# Patient Record
Sex: FEMALE | Race: WHITE | NOT HISPANIC OR LATINO
[De-identification: names, ages, dates, MRNs, and addresses within clinical notes are randomized per-mention and may not be internally consistent; named-entity substitution may affect disease eponyms.]

---

## 2019-06-26 ENCOUNTER — APPOINTMENT (OUTPATIENT)
Dept: GASTROENTEROLOGY | Facility: CLINIC | Age: 69
End: 2019-06-26
Payer: MEDICARE

## 2019-06-26 VITALS
HEIGHT: 64 IN | SYSTOLIC BLOOD PRESSURE: 175 MMHG | DIASTOLIC BLOOD PRESSURE: 95 MMHG | OXYGEN SATURATION: 96 % | HEART RATE: 79 BPM | BODY MASS INDEX: 23.73 KG/M2 | WEIGHT: 139 LBS | TEMPERATURE: 98.5 F

## 2019-06-26 DIAGNOSIS — Z87.891 PERSONAL HISTORY OF NICOTINE DEPENDENCE: ICD-10-CM

## 2019-06-26 DIAGNOSIS — Z86.79 PERSONAL HISTORY OF OTHER DISEASES OF THE CIRCULATORY SYSTEM: ICD-10-CM

## 2019-06-26 DIAGNOSIS — Z78.9 OTHER SPECIFIED HEALTH STATUS: ICD-10-CM

## 2019-06-26 DIAGNOSIS — R19.7 DIARRHEA, UNSPECIFIED: ICD-10-CM

## 2019-06-26 DIAGNOSIS — K55.20 ANGIODYSPLASIA OF COLON W/OUT HEMORRHAGE: ICD-10-CM

## 2019-06-26 DIAGNOSIS — Z86.39 PERSONAL HISTORY OF OTHER ENDOCRINE, NUTRITIONAL AND METABOLIC DISEASE: ICD-10-CM

## 2019-06-26 DIAGNOSIS — R10.84 GENERALIZED ABDOMINAL PAIN: ICD-10-CM

## 2019-06-26 PROCEDURE — 99213 OFFICE O/P EST LOW 20 MIN: CPT

## 2019-06-26 RX ORDER — DENOSUMAB 60 MG/ML
60 INJECTION SUBCUTANEOUS
Refills: 0 | Status: ACTIVE | COMMUNITY

## 2019-06-26 RX ORDER — METOPROLOL TARTRATE 25 MG/1
25 TABLET, FILM COATED ORAL
Refills: 0 | Status: ACTIVE | COMMUNITY

## 2019-06-26 RX ORDER — LIOTHYRONINE SODIUM 5 UG/1
5 TABLET ORAL
Refills: 0 | Status: ACTIVE | COMMUNITY

## 2019-06-26 RX ORDER — CLONAZEPAM 0.5 MG/1
0.5 TABLET ORAL
Refills: 0 | Status: ACTIVE | COMMUNITY

## 2019-06-26 RX ORDER — TELMISARTAN 40 MG/1
40 TABLET ORAL
Refills: 0 | Status: ACTIVE | COMMUNITY

## 2019-06-26 RX ORDER — POLYETHYLENE GLYCOL 3350, SODIUM CHLORIDE, SODIUM BICARBONATE AND POTASSIUM CHLORIDE WITH LEMON FLAVOR 420; 11.2; 5.72; 1.48 G/4L; G/4L; G/4L; G/4L
420 POWDER, FOR SOLUTION ORAL
Qty: 1 | Refills: 0 | Status: ACTIVE | COMMUNITY
Start: 2019-06-26 | End: 1900-01-01

## 2019-06-26 RX ORDER — CHOLESTYRAMINE 4 G/9G
4 POWDER, FOR SUSPENSION ORAL 3 TIMES DAILY
Qty: 30 | Refills: 3 | Status: ACTIVE | COMMUNITY
Start: 2019-06-26 | End: 1900-01-01

## 2019-06-26 RX ORDER — LEVOFLOXACIN 750 MG/1
750 TABLET, FILM COATED ORAL
Refills: 0 | Status: ACTIVE | COMMUNITY

## 2019-06-26 RX ORDER — ROSUVASTATIN CALCIUM 5 MG/1
TABLET, FILM COATED ORAL
Refills: 0 | Status: ACTIVE | COMMUNITY

## 2019-06-26 NOTE — HISTORY OF PRESENT ILLNESS
[None] : had no significant interval events [Heartburn] : denies heartburn [Vomiting] : denies vomiting [Constipation] : denies constipation [Yellow Skin Or Eyes (Jaundice)] : denies jaundice [Rectal Pain] : denies rectal pain [Wt Gain ___ Lbs] : no recent weight gain [Wt Loss ___ Lbs] : no recent weight loss [Nausea] : nausea [Diarrhea] : diarrhea [Abdominal Swelling] : abdominal swelling [Abdominal Pain] : abdominal pain [Hiatus Hernia] : hiatus hernia [GERD] : no gastroesophageal reflux disease [Peptic Ulcer Disease] : no peptic ulcer disease [Pancreatitis] : no pancreatitis [Cholelithiasis] : no cholelithiasis [Kidney Stone] : no kidney stone [Inflammatory Bowel Disease] : no inflammatory bowel disease [Alcohol Abuse] : no alcohol abuse [Irritable Bowel Syndrome] : no irritable bowel syndrome [Diverticulitis] : no diverticulitis [Malignancy] : no malignancy [Appendectomy] : no appendectomy [Cholecystectomy] : no cholecystectomy [Abdominal Surgery] : no abdominal surgery [de-identified] : The patient states that she is feeling uncomfortable x 2 weeks. The patient denies any jaundice or pruritus.  The patient denies any chronic lower back pain. The patient complains of abdominal pain.  The patient describes the abdominal pain as a sharp, crampy, intermittent diffuse abdominal discomfort that is nonradiating in nature.  The abdominal pain is unrelated to meals or passing gas or having bowel movements.  The abdominal pain is described as being mild in nature.  The abdominal pain occurred during the day.  The abdominal pain can occur at any time.   The abdominal pain has never awakened the patient from sleep.  The abdominal pain is relieved with certain medication such as Pepto bismol.  The abdominal pain is associated with abdominal gas and bloating.  The patient complains of nausea but denies any vomiting.  The patient denies any gastroesophageal reflux disease or dysphagia.  The patient denies any atypical chest pain, shortness of breath or palpitations.  The patient admits to occasional episodes of diaphoresis.  The patient complains of diarrhea but denies any constipation.  The patient has 3 to 5 bowel movements a day.  The patient complains of a change in bowel habits.  The patient complains of a change in caliber of stool.   The diarrhea is described as soft to watery in nature.  The patient does not remember eating food prior to the symptoms.  No other friends or family had been ill with similar complaints.  The patient denies any recent travel.  The patient denies having a similar episode in the past that resolved spontaneously.  The patient denies taking antibiotics prior to the symptoms.The patient denies having mucus discharge with the bowel movements.  The patient complains of dark stools secondary to Pepto Bismol but denies any bright red blood per rectum, melena or hematemesis.  The patient complains of rectal pain and pruritus.  The patient complains of weight loss but denies any anorexia.  The patient admits to losing 2 pounds over the past 1 week.  She denies any fevers or chills.  The upper endoscopy performed at the office on February 3, 2015 revealed a small hiatal hernia and mild diffuse bile gastritis. The pathology revealed squamous mucosa with reactive epithelial changes consistent with mild reflux and unremarkable cardio-oxyntic mucosa with no specialized columnar epithelium of Thurman's esophagus identified, gastric antral mucosa with mild chemical/reactive gastropathy with mild chronic inflammation and focal intestinal metaplasia that was negative for Helicobacter pylori and gastric oxyntic mucosa with proton pump inhibitor therapy effect with no evidence of intestinal metaplasia that was negative for Helicobacter pylori. The colonoscopy to the terminal ileum performed at the office on June 23, 2009 revealed scattered left-sided diverticulosis, a small nonbleeding transverse colon AVM and internal hemorrhoids The patient tolerated the procedures well. The patient admits to family history of GI problems. The patient's sister had a history of a colonic tumor.

## 2019-06-26 NOTE — ASSESSMENT
[FreeTextEntry1] : Abdominal Pain: The patient complains of abdominal pain. The patient is to avoid nonsteroidal anti-inflammatory drugs and aspirin.  I recommend a trial of Pepcid 40 mg twice a day for 3 months for the symptoms.\par Dyspepsia: The patient complains of dyspeptic symptoms.  The patient was advised to abide by an anti-gas diet.  The patient was given a pamphlet for anti-gas.  The patient and I reviewed the anti-gas diet at length. The patient is to start on a trial of Phazyme one tablet 3 times a day p.r.n. abdominal pain and gas.\par Diarrhea: I recommend continue on a trial of Pepto-Bismol two tablets 4 times a day for 4 days. I recommend sending stool studies for C+S, O+P x 3 and C. difficile toxin to assess for infectious colitis.  The patient complains of diarrhea.  The symptoms are worse after meals.  I recommend a trial of cholestyramine one packet once a day for possible bile induced diarrhea. \par Hiatal Hernia:  The patient was advised to avoid late-night meals and dietary indiscretions.  The patient was advised to avoid fried and fatty foods.  The patient was advised to abide by an anti-GERD diet. The patient was given a pamphlet for anti-GERD.  The patient and I reviewed the anti-GERD diet at length. I recommend a trial of Pantoprazole 40 mg once a day for 3 months for the symptoms.\par Gastritis: The patient has a history of gastritis. The patient is to avoid nonsteroidal anti-inflammatory drugs and aspirin. I recommend a trial of pantoprazole 40 mg once a day for 3 months for the symptoms. \par Diverticulosis: I recommend a low residue diet and avoid seeds. The patient is to avoid Metamucil or fiber supplementation. The patient is to also consider a trial of a probiotic such as Align once a day. \par Internal Hemorrhoids: The patient is to be started on a trial of Anusol H. C. suppositories one per rectum nightly and Anusol HC2 .5% cream apply to affected area twice a day p.r.n. hemorrhoidal bleeding or pain. \par Colonic AVMs: The patient had a prior colonoscopy that revealed nonbleeding colonic AVMs.  The colonic AVMs were not cauterized at the time. The patient denies any bright red blood per rectum, melena or hematemesis.  I will try to obtain the blood work to assess for anemia.  The patient is currently guaiac-negative on physical exam. If bleeding occurs, the patient may require ablation of a colonic AVM`s. \par Family History of Colon Cancer: The patient has a family history of colon cancer.  I recommend a repeat colonoscopy to reassess for colonic polyps.  The patient agreed and will follow up for the procedure. \par Upper Endoscopy and Colonoscopy: I recommend an upper endoscopy and colonoscopy to assess the symptoms.  The patient was told of the risks and benefits of the procedure.  The patient was told of the risks of perforation, emergency surgery, bleeding, infections and missed lesions.  The patient agreed and will schedule for the procedure.  The patient is to be n.p.o. after midnight and bowel prep was given.  The patient is to return for the procedure. \par Follow-up: The patient is to follow-up in the office in 2 to 3 weeks to reassess the symptoms. The patient was told to call the office if any further problems.\par \par

## 2019-06-27 LAB — HEMOCCULT STL QL: NEGATIVE

## 2019-06-28 LAB
G LAMBLIA AG STL QL: NORMAL
RV AG STL QL IA: NORMAL

## 2019-07-01 ENCOUNTER — MESSAGE (OUTPATIENT)
Age: 69
End: 2019-07-01

## 2019-07-01 LAB
BACTERIA STL CULT: NORMAL
C DIFF TOX GENS STL QL NAA+PROBE: NORMAL
CDIFF BY PCR: NOT DETECTED
DEPRECATED O AND P PREP STL: NORMAL

## 2019-07-02 LAB — E HISTOLYT AG STL IA-ACNC: NOT DETECTED

## 2019-07-25 ENCOUNTER — OUTPATIENT (OUTPATIENT)
Dept: OUTPATIENT SERVICES | Facility: HOSPITAL | Age: 69
LOS: 1 days | End: 2019-07-25
Payer: MEDICARE

## 2019-07-25 ENCOUNTER — APPOINTMENT (OUTPATIENT)
Dept: GASTROENTEROLOGY | Facility: HOSPITAL | Age: 69
End: 2019-07-25

## 2019-07-25 ENCOUNTER — RESULT REVIEW (OUTPATIENT)
Age: 69
End: 2019-07-25

## 2019-07-25 DIAGNOSIS — R19.7 DIARRHEA, UNSPECIFIED: ICD-10-CM

## 2019-07-25 DIAGNOSIS — R10.84 GENERALIZED ABDOMINAL PAIN: ICD-10-CM

## 2019-07-25 DIAGNOSIS — Z98.89 OTHER SPECIFIED POSTPROCEDURAL STATES: Chronic | ICD-10-CM

## 2019-07-25 DIAGNOSIS — K30 FUNCTIONAL DYSPEPSIA: ICD-10-CM

## 2019-07-25 PROCEDURE — 88305 TISSUE EXAM BY PATHOLOGIST: CPT | Mod: 26

## 2019-07-25 PROCEDURE — 43239 EGD BIOPSY SINGLE/MULTIPLE: CPT

## 2019-07-25 PROCEDURE — 88312 SPECIAL STAINS GROUP 1: CPT

## 2019-07-25 PROCEDURE — 88312 SPECIAL STAINS GROUP 1: CPT | Mod: 26

## 2019-07-25 PROCEDURE — 45380 COLONOSCOPY AND BIOPSY: CPT

## 2019-07-25 PROCEDURE — 88305 TISSUE EXAM BY PATHOLOGIST: CPT

## 2019-07-25 PROCEDURE — G0105: CPT

## 2019-07-29 ENCOUNTER — APPOINTMENT (OUTPATIENT)
Dept: GASTROENTEROLOGY | Facility: CLINIC | Age: 69
End: 2019-07-29

## 2019-07-29 LAB — SURGICAL PATHOLOGY STUDY: SIGNIFICANT CHANGE UP

## 2019-10-30 ENCOUNTER — APPOINTMENT (OUTPATIENT)
Dept: GASTROENTEROLOGY | Facility: CLINIC | Age: 69
End: 2019-10-30
Payer: MEDICARE

## 2019-10-30 VITALS
TEMPERATURE: 97.5 F | HEART RATE: 85 BPM | OXYGEN SATURATION: 98 % | SYSTOLIC BLOOD PRESSURE: 175 MMHG | HEIGHT: 64 IN | BODY MASS INDEX: 24.24 KG/M2 | WEIGHT: 142 LBS | DIASTOLIC BLOOD PRESSURE: 90 MMHG

## 2019-10-30 DIAGNOSIS — K57.30 DIVERTICULOSIS OF LARGE INTESTINE W/OUT PERFORATION OR ABSCESS W/OUT BLEEDING: ICD-10-CM

## 2019-10-30 PROCEDURE — 99213 OFFICE O/P EST LOW 20 MIN: CPT

## 2019-10-30 NOTE — HISTORY OF PRESENT ILLNESS
[None] : had no significant interval events [Nausea] : denies nausea [Vomiting] : denies vomiting [Diarrhea] : denies diarrhea [Constipation] : denies constipation [Yellow Skin Or Eyes (Jaundice)] : denies jaundice [Abdominal Pain] : denies abdominal pain [Abdominal Swelling] : denies abdominal swelling [Rectal Pain] : denies rectal pain [Heartburn] : heartburn [GERD] : gastroesophageal reflux disease [Kidney Stone] : kidney stone [Wt Gain ___ Lbs] : no recent weight gain [Wt Loss ___ Lbs] : no recent weight loss [Hiatus Hernia] : no hiatus hernia [Peptic Ulcer Disease] : no peptic ulcer disease [Pancreatitis] : no pancreatitis [Cholelithiasis] : no cholelithiasis [Inflammatory Bowel Disease] : no inflammatory bowel disease [Irritable Bowel Syndrome] : no irritable bowel syndrome [Diverticulitis] : no diverticulitis [Alcohol Abuse] : no alcohol abuse [Malignancy] : no malignancy [Abdominal Surgery] : no abdominal surgery [Appendectomy] : no appendectomy [Cholecystectomy] : no cholecystectomy [de-identified] : The patient states that she is feeling better. The patient denies any jaundice or pruritus.  The patient denies any chronic lower back pain. The patient denies any abdominal pain.  The patient denies any abdominal gas and bloating.  The patient denies any nausea or vomiting.  The patient complains of occasional gastroesophageal reflux disease but denies any dysphagia.  The patient denies taking medications for the gastroesophageal reflux disease.  The gastroesophageal reflux disease is worse after meals and late at night and in the early morning. The gastroesophageal reflux disease previously improved with proton pump inhibitors, H2 blockers and antacids.   The patient denies any atypical chest pain, shortness of breath or palpitations.  The patient admits to occasional episodes of diaphoresis.  The patient denies any constipation or diarrhea.  The patient has 2 bowel movements a day.  The patient denies a change in bowel habits.  The patient denies a change in caliber of stool.  The patient denies having mucus discharge with the bowel movements.  The patient any bright red blood per rectum, melena or hematemesis.  The patient complains of rectal pruritus but denies any rectal pain.  The patient denies any weight loss or anorexia.  She denies any fevers or chills.  The patient had an upper endoscopy and colonoscopy to the terminal ileum performed at the [facility] GI endoscopy suite on July 25, 2019. The upper endoscopy was performed up to the level of the second portion of the duodenum. The upper endoscopy revealed mild diffuse bile gastritis. Biopsies were taken of the distal esophagus, antrum, body of stomach and duodenum to assess for esophagitis, gastritis and duodenitis. The pathology revealed distal esophagus with unremarkable squamous esophageal epithelium with no evidence of fungal microorganisms, gastric antral mucosa with chronic inactive gastritis with focal incomplete intestinal metaplasia that was negative for Helicobacter pylori, gastric body mucosa with unremarkable gastric oxyntic type mucosa that was negative for Helicobacter pylori and unremarkable duodenal mucosa with preserved villous architecture with no evidence of parasites or intraepithelial lymphocytes.  The colonoscopy to the terminal ileum revealed scattered right sided diverticulosis, a long and tortuous colon and internal hemorrhoids.  Random biopsies were taken of the terminal ileum and cecum to assess for ileitis and microscopic colitis. There were no polyps, masses, AVMs or colitis noted.  The pathology revealed unremarkable ileal and cecal mucosa.  The patient tolerated the procedures well. The stool studies for C&S, ova and parasite and C. difficile toxin performed June 27, 2019 were negative. The patient states that she is feeling better on cholestyramine one packet once a day.  The patient admits to family history of GI problems. The patient's sister had a history of a colonic tumor.

## 2019-10-30 NOTE — ASSESSMENT
[FreeTextEntry1] : GERD: The patient was advised to avoid late-night meals and dietary indiscretions.  The patient was advised to avoid fried and fatty foods.  The patient was advised to abide by an anti-GERD diet. The patient was given a pamphlet for anti-GERD.  The patient and I reviewed the anti-GERD diet at length. I recommend a trial of Pepcid 40 mg twice a day PRN for the symptoms.\par Gastritis: The patient has a history of gastritis. The patient is to avoid nonsteroidal anti-inflammatory drugs and aspirin. I recommend a trial of pantoprazole 40 mg once a day for 3 months for the symptoms. \par Intestinal Metaplasia in Stomach: I recommend a repeat upper endoscopy  to reassess the intestinal metaplasia in 3 years unless symptomatic.  The patient agreed and will followup for the procedure. \par Diverticulosis: I recommend a low residue diet and avoid seeds. The patient is to avoid Metamucil or fiber supplementation. The patient is to also consider a trial of a probiotic such as Align once a day. \par Internal Hemorrhoids: The patient is to be started on a trial of Anusol H. C. suppositories one per rectum nightly and Anusol HC2.5% cream apply to affected area twice a day p.r.n. hemorrhoidal bleeding or pain. \par Family History of Colon Cancer: The patient has a family history of colon cancer. I recommend a repeat colonoscopy in 5 years to reassess for colonic polyps unless symptomatic. The patient agreed and will follow up for the procedure. \par Follow-up: The patient is to follow-up in the office in 12 months to reassess the symptoms. The patient was told to call the office if any further problems.\par \par \par

## 2020-02-24 ENCOUNTER — RX RENEWAL (OUTPATIENT)
Age: 70
End: 2020-02-24

## 2020-03-05 ENCOUNTER — RESULT REVIEW (OUTPATIENT)
Age: 70
End: 2020-03-05

## 2020-06-15 ENCOUNTER — RX RENEWAL (OUTPATIENT)
Age: 70
End: 2020-06-15

## 2020-06-15 RX ORDER — FAMOTIDINE 40 MG/1
40 TABLET, FILM COATED ORAL TWICE DAILY
Qty: 60 | Refills: 3 | Status: ACTIVE | COMMUNITY
Start: 2019-10-30 | End: 1900-01-01

## 2021-08-23 ENCOUNTER — APPOINTMENT (OUTPATIENT)
Dept: GASTROENTEROLOGY | Facility: CLINIC | Age: 71
End: 2021-08-23
Payer: MEDICARE

## 2021-08-23 VITALS
HEIGHT: 64 IN | OXYGEN SATURATION: 97 % | TEMPERATURE: 96.8 F | WEIGHT: 127 LBS | HEART RATE: 91 BPM | DIASTOLIC BLOOD PRESSURE: 89 MMHG | BODY MASS INDEX: 21.68 KG/M2 | SYSTOLIC BLOOD PRESSURE: 153 MMHG

## 2021-08-23 DIAGNOSIS — Z87.19 PERSONAL HISTORY OF OTHER DISEASES OF THE DIGESTIVE SYSTEM: ICD-10-CM

## 2021-08-23 DIAGNOSIS — K30 FUNCTIONAL DYSPEPSIA: ICD-10-CM

## 2021-08-23 PROCEDURE — 99214 OFFICE O/P EST MOD 30 MIN: CPT

## 2021-08-23 NOTE — ASSESSMENT
[FreeTextEntry1] : Abdominal Pain: The patient complains of abdominal pain. The patient is to avoid nonsteroidal anti-inflammatory drugs and aspirin. I recommend a trial of Pantoprazole 40 mg once a day for 3 months for the symptoms.  \par Dyspepsia: The patient complains of dyspeptic symptoms.  The patient was advised to abide by an anti-gas diet.  The patient was given a pamphlet for anti-gas.  The patient and I reviewed the anti-gas diet at length. The patient is to start on a trial of Phazyme one tablet 3 times a day p.r.n. abdominal pain and gas.\par GERD: The patient was advised to avoid late-night meals and dietary indiscretions.  The patient was advised to avoid fried and fatty foods.  The patient was advised to abide by an anti-GERD diet. The patient was given a pamphlet for anti-GERD.  The patient and I reviewed the anti-GERD diet at length. I recommend a trial of Pantoprazole 40 mg once a day x 3 months for the symptoms.\par Rectal Bleeding: The patient had episodes of rectal bleeding.  The etiology of the rectal bleeding is unclear.  I recommend a trial of Anusol HC suppositories one per rectum QHS and Anusol HC 2.5% cream apply to affected area twice a day PRN hemorrhoidal bleeding or pain.  I recommend a trial of Calmoseptine cream apply to affected area twice a day for rectal discomfort. I recommend Tucks pads for the hemorrhoids.  I recommend starting Sitz baths twice a day for the hemorrhoids.  I recommend avoid wearing tight underwear and use boxers. I recommend avoid touching the perineal area. If the symptoms persist, I recommend a surgical evaluation for possible band ligation of the hemorrhoids. The patient was given the name of Dr. Favian Ramírez for possible surgery. If the symptoms persist, the patient may require a colonoscopy to assess the site of bleeding. The patient was told of the risks and benefits of the procedure.  The patient was told of the risks of perforation, emergency surgery, bleeding, infections and missed lesions. The patient agrees and will follow up to assess the symptoms.\par Gastritis: The patient has a history of gastritis. The patient is to avoid nonsteroidal anti-inflammatory drugs and aspirin. I recommend restarting a trial of pantoprazole 40 mg once a day for 3 months for the symptoms. \par Intestinal Metaplasia in Stomach: I recommend a repeat upper endoscopy to reassess the intestinal metaplasia . The patient agreed and will followup for the procedure. \par Diverticulosis: I recommend a low residue diet and avoid seeds. The patient is to avoid Metamucil or fiber supplementation. The patient is to also consider a trial of a probiotic such as Align once a day. \par Internal Hemorrhoids: The patient is to be started on a trial of Anusol H. C. suppositories one per rectum nightly and Anusol HC2.5% cream apply to affected area twice a day p.r.n. hemorrhoidal bleeding or pain. \par Family History of Colon Cancer: The patient has a family history of colon cancer. I recommend a repeat colonoscopy in 5 years to reassess for colonic polyps unless symptomatic. The patient agreed and will follow up for the procedure. \par Follow-up: The patient is to follow-up in the office in 4 weeks to reassess the symptoms. The patient was told to call the office if any further problems.\par \par \par

## 2021-08-23 NOTE — HISTORY OF PRESENT ILLNESS
[None] : had no significant interval events [Nausea] : denies nausea [Vomiting] : denies vomiting [Diarrhea] : denies diarrhea [Constipation] : denies constipation [Yellow Skin Or Eyes (Jaundice)] : denies jaundice [Rectal Pain] : denies rectal pain [Wt Loss ___ Lbs] : recent [unfilled] ~Upound(s) weight loss [Heartburn] : heartburn [Abdominal Pain] : abdominal pain [Abdominal Swelling] : abdominal swelling [GERD] : gastroesophageal reflux disease [Kidney Stone] : kidney stone [Malignancy] : malignancy [Wt Gain ___ Lbs] : no recent weight gain [Hiatus Hernia] : no hiatus hernia [Peptic Ulcer Disease] : no peptic ulcer disease [Pancreatitis] : no pancreatitis [Cholelithiasis] : no cholelithiasis [Inflammatory Bowel Disease] : no inflammatory bowel disease [Irritable Bowel Syndrome] : no irritable bowel syndrome [Diverticulitis] : no diverticulitis [Alcohol Abuse] : no alcohol abuse [Abdominal Surgery] : no abdominal surgery [Appendectomy] : no appendectomy [Cholecystectomy] : no cholecystectomy [de-identified] : The patient states that she is feeling uncomfortable x several weeks. The patient is s/p left breast ancer, s/p left lumpectomy and s/p radiation therapy.  The patient is being followed by her surgeon, Dr. Lorie Rodriguez and oncologist, Dr. Hawa Begum.  The patient denies any jaundice or pruritus.  The patient denies any chronic lower back pain. The patient complains of abdominal pain.  The patient describes the abdominal pain as a burning, intermittent midepigastric discomfort that is nonradiating in nature.  The abdominal pain is worse with stress.  The abdominal pain is worse with meals and improves with passing gas or having a bowel movement.  The abdominal pain is described as mild to moderate in nature.  The abdominal pain occurs at night and in the morning.  The abdominal pain can occur at any time.   The abdominal pain has never awakened the patient from sleep.  The abdominal pain is slightly relieved with certain medication such as proton pump inhibitors, H2 blockers and antacids.  The abdominal pain is associated with abdominal gas and bloating.  The patient complains of nausea but denies any vomiting.  The patient complains of gastroesophageal reflux disease and occasional dysphagia.  The dysphagia occurs with both solids and liquids.  The gastroesophageal reflux disease is worse after meals and late at night and in the early morning. The gastroesophageal reflux disease is slightly improved with proton pump inhibitors, H2 blockers and antacids.   The patient denies any atypical chest pain, shortness of breath or palpitations.  The patient is being followed by her cardiologist, Dr. Albert Galvez for her heart.  She is currently on Metoprolol.   The patient admits to occasional episodes of diaphoresis.  The patient denies any constipation or diarrhea.  The patient has 1 bowel movement a day.  The patient denies a change in bowel habits.  The patient denies a change in caliber of stool.  The patient denies having mucus discharge with the bowel movements.  The patient complains of occasional lower GI bleeding but denies any melena or hematemesis.  The lower GI bleeding is associated with hemorrhoids.  The patient complains of rectal pain and rectal pruritus.  The patient complains of weight loss but denies any anorexia.  The patient admits to losing 10 pounds over the past 6 months.  She denies any fevers or chills.  The patient had an upper endoscopy and colonoscopy to the terminal ileum performed at the [facility] GI endoscopy suite on July 25, 2019. The upper endoscopy was performed up to the level of the second portion of the duodenum. The upper endoscopy revealed mild diffuse bile gastritis. Biopsies were taken of the distal esophagus, antrum, body of stomach and duodenum to assess for esophagitis, gastritis and duodenitis. The pathology revealed distal esophagus with unremarkable squamous esophageal epithelium with no evidence of fungal microorganisms, gastric antral mucosa with chronic inactive gastritis with focal incomplete intestinal metaplasia that was negative for Helicobacter pylori, gastric body mucosa with unremarkable gastric oxyntic type mucosa that was negative for Helicobacter pylori and unremarkable duodenal mucosa with preserved villous architecture with no evidence of parasites or intraepithelial lymphocytes. The colonoscopy to the terminal ileum revealed scattered right sided diverticulosis, a long and tortuous colon and internal hemorrhoids. Random biopsies were taken of the terminal ileum and cecum to assess for ileitis and microscopic colitis. There were no polyps, masses, AVMs or colitis noted. The pathology revealed unremarkable ileal and cecal mucosa. The patient tolerated the procedures well. The stool studies for C&S, ova and parasite and C. difficile toxin performed June 27, 2019 were negative. The patient states that she is feeling better on cholestyramine one packet once a day. The patient admits to family history of GI problems. The patient's sister had a history of a colonic tumor.  [de-identified] : (+) prior smoking 3 cigarettes a day x 2 years, stopped x 40 years, (-) ETOH, (-) IVDA\par

## 2021-09-03 DIAGNOSIS — Z01.818 ENCOUNTER FOR OTHER PREPROCEDURAL EXAMINATION: ICD-10-CM

## 2021-09-07 ENCOUNTER — APPOINTMENT (OUTPATIENT)
Dept: DISASTER EMERGENCY | Facility: CLINIC | Age: 71
End: 2021-09-07

## 2021-09-08 LAB — SARS-COV-2 N GENE NPH QL NAA+PROBE: NOT DETECTED

## 2021-09-09 ENCOUNTER — OUTPATIENT (OUTPATIENT)
Dept: OUTPATIENT SERVICES | Facility: HOSPITAL | Age: 71
LOS: 1 days | End: 2021-09-09
Payer: MEDICARE

## 2021-09-09 ENCOUNTER — APPOINTMENT (OUTPATIENT)
Dept: GASTROENTEROLOGY | Facility: HOSPITAL | Age: 71
End: 2021-09-09

## 2021-09-09 ENCOUNTER — RESULT REVIEW (OUTPATIENT)
Age: 71
End: 2021-09-09

## 2021-09-09 DIAGNOSIS — K31.89 OTHER DISEASES OF STOMACH AND DUODENUM: ICD-10-CM

## 2021-09-09 DIAGNOSIS — K21.9 GASTRO-ESOPHAGEAL REFLUX DISEASE WITHOUT ESOPHAGITIS: ICD-10-CM

## 2021-09-09 DIAGNOSIS — R10.13 EPIGASTRIC PAIN: ICD-10-CM

## 2021-09-09 DIAGNOSIS — Z98.89 OTHER SPECIFIED POSTPROCEDURAL STATES: Chronic | ICD-10-CM

## 2021-09-09 PROCEDURE — 43239 EGD BIOPSY SINGLE/MULTIPLE: CPT

## 2021-09-09 PROCEDURE — 88305 TISSUE EXAM BY PATHOLOGIST: CPT

## 2021-09-09 PROCEDURE — 88312 SPECIAL STAINS GROUP 1: CPT | Mod: 26

## 2021-09-09 PROCEDURE — 88312 SPECIAL STAINS GROUP 1: CPT

## 2021-09-09 PROCEDURE — 88305 TISSUE EXAM BY PATHOLOGIST: CPT | Mod: 26

## 2021-09-09 RX ORDER — PANTOPRAZOLE 40 MG/1
40 TABLET, DELAYED RELEASE ORAL DAILY
Qty: 30 | Refills: 3 | Status: ACTIVE | COMMUNITY
Start: 2021-09-09 | End: 1900-01-01

## 2021-09-09 NOTE — CHART NOTE - NSCHARTNOTEFT_GEN_A_CORE
Esophagogastroduodenoscopy Report:    Indication:             Abdominal pain, dyspepsia, GERD, Hx. of intestinal metaplasia  Referring MD:          Instrument:  #  Anesthesia:            MAC    Consent:  Informed consent was obtained from the patient after providing any opportunity for questions  Procedure: The gastroscope was gently passed through the incisoral orifice into the oral cavity and under direct visualization the esophagus was intubated. The endoscope was passed down the esophagus, through the stomach and into proximal jejunum. Color, texture, mucosa and anatomy of the esophagus, stomach, and duodenum were carefully examined with the scope. The patient tolerated the procedure well. After completion of the examination, the patient was transferred to the recovery room.     Procedure: Upper endoscopy and biopsies    Preparation: NPO     Findings:     Oropharynx:	   Normal appearing oropharynx.  Esophagus:	   Normal appearing esophagus with no ulcers, erosions, erythema noted.  Biopsy taken.  EG-junction:	   Normal appearing E-G junction at 35 cm. No hiatal hernia noted. No ulcers, erosions or erythema noted.  Cardia:	                 Normal appearing gastric mucosa with no ulcers, erosions or erythema noted. (+) Clear liquid suctioned.  Body:	                 Normal appearing gastric mucosa with no ulcers, erosions or erythema noted.  Biopsy taken.  Antrum:	                 Normal appearing gastric mucosa with no ulcers, erosions or erythema noted.  Biopsy taken.  Pylorus:	                 Normal appearing pylorus and pyloric channel with no ulcers, erosions or erythema noted.     Duodenal Bulb:         Normal appearing duodenal mucosa with no ulcers, erosions or erythema noted. Biopsy taken.  2nd portion:	   Normal appearing duodenal mucosa with no ulcers, erosions or erythema noted.  Biopsy taken.  3rd portion:	   Not visualized.      EBL:0    Impression: Small hiatal hernia, Mild diffuse bile gastritis    Plan:    1. Avoid late-night meals and dietary indiscretions.  2. Avoid fried and fatty foods.  3. Avoid nonsteroidal anti-inflammatory drugs and aspirin.  4. Will check path results.  5. Recommend a trial of pantoprazole 40 mg once a day for 3 months.  6. Followup in office in 4 weeks to reassess the symptoms and discuss the findings.      Procedure Start Time:   Procedure End Time:         Attending:       Darin Harrison M.D. Esophagogastroduodenoscopy Report:    Indication:             Abdominal pain, dyspepsia, GERD, Hx. of intestinal metaplasia  Referring MD:       Dr. Hannah Ruby  Instrument:  #        [id number]  Anesthesia:            MAC    Consent:  Informed consent was obtained from the patient after providing any opportunity for questions  Procedure: The gastroscope was gently passed through the incisoral orifice into the oral cavity and under direct visualization the esophagus was intubated. The endoscope was passed down the esophagus, through the stomach and into proximal jejunum. Color, texture, mucosa and anatomy of the esophagus, stomach, and duodenum were carefully examined with the scope. The patient tolerated the procedure well. After completion of the examination, the patient was transferred to the recovery room.     Procedure: Upper endoscopy and biopsies    Preparation: NPO     Findings:     Oropharynx:	   Normal appearing oropharynx.  Esophagus:	   Normal appearing esophagus with no ulcers, erosions, erythema noted.  Biopsy taken.  EG-junction:	   Normal appearing E-G junction at 38 cm. (+) Small hiatal hernia noted. No ulcers, erosions or erythema noted.  Cardia:	                 Mild diffuse erythema suggestive of gastritis but no ulcers or erosions noted.  (+) Clear liquid suctioned. Biopsy taken.   Body:	                 Mild diffuse erythema suggestive of gastritis but no ulcers or erosions noted. Biopsy taken.  Antrum:	                 Mild diffuse erythema suggestive of gastritis but no ulcers or erosions noted. Biopsy taken.  Pylorus:	                 Normal appearing pylorus and pyloric channel with no ulcers, erosions or erythema noted.     Duodenal Bulb:         Normal appearing duodenal mucosa with no ulcers, erosions or erythema noted. Biopsy taken.  2nd portion:	   Normal appearing duodenal mucosa with no ulcers, erosions or erythema noted.  Biopsy taken.  3rd portion:	   Not visualized.      EBL:0    Impression: 1. Small hiatal hernia 2. Mild diffuse gastritis    Plan:    1. Avoid late-night meals and dietary indiscretions.  2. Avoid fried and fatty foods.  3. Avoid nonsteroidal anti-inflammatory drugs and aspirin.  4. Will check path results.  5. Recommend a trial of pantoprazole 40 mg once a day for 3 months.  6. Consider repeat upper endoscopy in 3 years pending path results.  7. Followup in office in 4 weeks to reassess the symptoms and discuss the findings.      Procedure Start Time:   1:34 pm  Procedure End Time:    1:41 pm      Attending:       Darin Harrison M.D.

## 2021-09-14 LAB — SURGICAL PATHOLOGY STUDY: SIGNIFICANT CHANGE UP

## 2021-09-15 ENCOUNTER — NON-APPOINTMENT (OUTPATIENT)
Age: 71
End: 2021-09-15

## 2021-11-29 ENCOUNTER — APPOINTMENT (OUTPATIENT)
Dept: GASTROENTEROLOGY | Facility: CLINIC | Age: 71
End: 2021-11-29
Payer: MEDICARE

## 2021-11-29 VITALS
OXYGEN SATURATION: 98 % | BODY MASS INDEX: 22.2 KG/M2 | SYSTOLIC BLOOD PRESSURE: 146 MMHG | HEART RATE: 92 BPM | WEIGHT: 130 LBS | HEIGHT: 64 IN | DIASTOLIC BLOOD PRESSURE: 86 MMHG | TEMPERATURE: 97 F

## 2021-11-29 DIAGNOSIS — K62.89 OTHER SPECIFIED DISEASES OF ANUS AND RECTUM: ICD-10-CM

## 2021-11-29 DIAGNOSIS — R10.13 EPIGASTRIC PAIN: ICD-10-CM

## 2021-11-29 DIAGNOSIS — K64.8 OTHER HEMORRHOIDS: ICD-10-CM

## 2021-11-29 PROCEDURE — 99214 OFFICE O/P EST MOD 30 MIN: CPT

## 2021-11-29 RX ORDER — FAMOTIDINE 40 MG/1
40 TABLET, FILM COATED ORAL
Qty: 60 | Refills: 3 | Status: ACTIVE | COMMUNITY
Start: 2021-11-29 | End: 1900-01-01

## 2021-11-29 RX ORDER — HYDROCORTISONE 25 MG/G
2.5 CREAM TOPICAL
Qty: 2 | Refills: 3 | Status: ACTIVE | COMMUNITY
Start: 2021-11-29 | End: 1900-01-01

## 2021-11-29 RX ORDER — HYDROCORTISONE ACETATE 25 MG/1
25 SUPPOSITORY RECTAL
Qty: 30 | Refills: 3 | Status: ACTIVE | COMMUNITY
Start: 2021-11-29 | End: 1900-01-01

## 2021-11-29 NOTE — HISTORY OF PRESENT ILLNESS
[None] : had no significant interval events [Nausea] : denies nausea [Vomiting] : denies vomiting [Diarrhea] : denies diarrhea [Yellow Skin Or Eyes (Jaundice)] : denies jaundice [Rectal Pain] : denies rectal pain [Wt Loss ___ Lbs] : recent [unfilled] ~Upound(s) weight loss [Heartburn] : heartburn [Constipation] : constipation [Abdominal Pain] : abdominal pain [Abdominal Swelling] : abdominal swelling [GERD] : gastroesophageal reflux disease [Hiatus Hernia] : hiatus hernia [Kidney Stone] : kidney stone [Wt Gain ___ Lbs] : no recent weight gain [Peptic Ulcer Disease] : no peptic ulcer disease [Pancreatitis] : no pancreatitis [Cholelithiasis] : no cholelithiasis [Inflammatory Bowel Disease] : no inflammatory bowel disease [Irritable Bowel Syndrome] : no irritable bowel syndrome [Diverticulitis] : no diverticulitis [Alcohol Abuse] : no alcohol abuse [Malignancy] : no malignancy [Abdominal Surgery] : no abdominal surgery [Appendectomy] : no appendectomy [Cholecystectomy] : no cholecystectomy [de-identified] : The patient states that she is feeling poorly x 3 months. The patient denies any jaundice or pruritus.  The patient complains of occasional lower back pain. The patient complains of abdominal pain.  The patient describes the abdominal pain as a crampy, burning, intermittent midepigastric discomfort that is nonradiating in nature.  The abdominal pain is worse with stress.  The abdominal pain is worse with meals and improves with passing gas or having a bowel movement.  The abdominal pain is described as mild to moderate in nature.  The abdominal pain occurs at night and in the morning.  The abdominal pain can occur at any time.   The abdominal pain has never awakened the patient from sleep.  The abdominal pain is relieved with certain medication such as proton pump inhibitors, H2 blockers and antacids.  The abdominal pain is associated with abdominal gas and bloating.  The patient denies any nausea or vomiting.  The patient complains of occasional gastroesophageal reflux disease but denies any dysphagia.  The gastroesophageal reflux disease is worse after meals and late at night and in the early morning. The gastroesophageal reflux disease is improved with proton pump inhibitors, H2 blockers and antacids.   The patient denies any atypical chest pain, shortness of breath or palpitations.  The patient denies any diaphoresis. The patient complains of occasional constipation but denies any diarrhea.  The patient has 1 to 2 bowel movement every 1 to 2 days.  The patient complains of a change in bowel habits.  The patient complains of a change in caliber of stool.   The patient denies having mucus discharge with the bowel movements.  The patient complains of occasional lower GI bleeding but denies any melena or hematemesis.  The lower GI bleeding is associated with hemorrhoids and constipation.  The patient complains of rectal pain and rectal pruritus.  The patient complains of weight loss and anorexia.  The patient admits to losing 10 pounds over the past 4 months.  She denies any fevers or chills.  The patient had an upper endoscopy at the [facility] endoscopy suite on September 09, 2021. The upper endoscopy was performed up to the level of the second portion of the duodenum. The upper endoscopy revealed a small hiatal hernia and mild diffuse gastritis. Biopsies were taken of the distal esophagus, antrum, body of stomach and duodenum to assess for esophagitis, gastritis and duodenitis. The gastric pathology performed on September 9, 2021 revealed fragments of unremarkable squamous esophageal epithelium with a PASF stain that was negative for fungal microorganisms (esophagus), gastric mucosa with chronic inactive gastritis with foci of incomplete intestinal metaplasia that was negative for Helicobacter pylori (gastric lesser curvature of stomach), gastric mucosa with chronic inactive gastritis that was negative for Helicobacter pylori (greater curvature of the stomach and body and lesser curvature of the cardia), gastric antral mucosa with chronic inactive gastritis with foci of incomplete intestinal metaplasia that was negative for Helicobacter pylori and duodenal mucosa with focal gastric mucin cell metaplasia (antrum) and duodenal mucosa with preserved villous architecture with no parasites or increased intraepithelial lymphocytes identified (duodenum). The patient tolerated the procedure well.  The patient had an upper endoscopy and colonoscopy to the terminal ileum performed at the [facility] endoscopy suite on July 25, 2019. The upper endoscopy was performed up to the level of the second portion of the duodenum. The upper endoscopy revealed mild diffuse bile gastritis. Biopsies were taken of the distal esophagus, antrum, body of stomach and duodenum to assess for esophagitis, gastritis and duodenitis. The pathology revealed distal esophagus with unremarkable squamous esophageal epithelium with no evidence of fungal microorganisms, gastric antral mucosa with chronic inactive gastritis with focal incomplete intestinal metaplasia that was negative for Helicobacter pylori, gastric body mucosa with unremarkable gastric oxyntic type mucosa that was negative for Helicobacter pylori and unremarkable duodenal mucosa with preserved villous architecture with no evidence of parasites or intraepithelial lymphocytes. The colonoscopy to the terminal ileum revealed scattered right sided diverticulosis, a long and tortuous colon and internal hemorrhoids. Random biopsies were taken of the terminal ileum and cecum to assess for ileitis and microscopic colitis. There were no polyps, masses, AVMs or colitis noted. The pathology revealed unremarkable ileal and cecal mucosa. The patient tolerated the procedures well. The stool studies for C&S, ova and parasite and C. difficile toxin performed June 27, 2019 were negative. The patient states that she is feeling better on cholestyramine one packet once a day. The patient is s/p left breast cancer, s/p left lumpectomy and s/p radiation therapy. The patient is being followed by her surgeon, Dr. Lorie Rodriguez and oncologist, Dr. Hawa Begum. The patient is being followed by her cardiologist, Dr. Albert Galvez for her heart. She is currently on Metoprolol. The patient admits to family history of GI problems. The patient's sister had a history of a colonic tumor.  [de-identified] :  (+) prior smoking 3 cigarettes a day x 2 years, stopped x 40 years, (-) ETOH, (-) IVDA

## 2021-11-29 NOTE — ASSESSMENT
[FreeTextEntry1] : Abdominal Pain: The patient complains of abdominal pain. The patient is to avoid nonsteroidal anti-inflammatory drugs and aspirin.  I recommend a trial of Pepcid 40 mg twice a day for 3 months for the symptoms.\par Dyspepsia: The patient complains of dyspeptic symptoms.  The patient was advised to abide by an anti-gas diet.  The patient was given a pamphlet for anti-gas.  The patient and I reviewed the anti-gas diet at length. The patient is to start on a trial of Phazyme one tablet 3 times a day p.r.n. abdominal pain and gas.\par GERD: The patient was advised to avoid late-night meals and dietary indiscretions.  The patient was advised to avoid fried and fatty foods.  The patient was advised to abide by an anti-GERD diet. The patient was given a pamphlet for anti-GERD.  The patient and I reviewed the anti-GERD diet at length. I recommend a trial of Pepcid 40 mg twice a day x 3 months for the symptoms.\par Rectal Pain: The patient had episodes of rectal pain.  The etiology of the rectal pain is unclear.  I recommend a trial of Anusol HC suppositories one per rectum QHS and Anusol HC 2.5% cream apply to affected area twice a day PRN hemorrhoidal bleeding or pain.  I recommend a trial of Calmoseptine cream apply to affected area twice a day for rectal discomfort. I recommend Tucks pads for the hemorrhoids.  I recommend starting Sitz baths twice a day for the hemorrhoids.  I recommend avoid wearing tight underwear and use boxers. I recommend avoid touching the perineal area. If the symptoms persist, I recommend a surgical evaluation for possible band ligation of the hemorrhoids. The patient was given the name of colorectal surgeon, Dr. Jason Paredes for possible surgery. If the symptoms persist, the patient may require a colonoscopy to assess the site of bleeding. The patient was told of the risks and benefits of the procedure.  The patient was told of the risks of perforation, emergency surgery, bleeding, infections and missed lesions. The patient agrees and will follow up to assess the symptoms\par Hiatal Hernia:  The patient was advised to avoid late-night meals and dietary indiscretions.  The patient was advised to avoid fried and fatty foods.  The patient was advised to abide by an anti-GERD diet. The patient was given a pamphlet for anti-GERD.  The patient and I reviewed the anti-GERD diet at length. I recommend a trial of Pantoprazole 40 mg once a day for 3 months for the symptoms.\par Gastritis: The patient has a history of gastritis. The patient is to avoid nonsteroidal anti-inflammatory drugs and aspirin. I recommend a trial of pantoprazole 40 mg once a day for 3 months for the symptoms. \par Intestinal Metaplasia of the Stomach:  The patient was found to have intestinal metaplasia of the stomach on recent upper endoscopy.  The findings of intestinal metaplasia of the stomach have an increased risk of gastric cancer.  Recent biopsies did not reveal dysplasia.  I recommend a repeat upper endoscopy with mapping in 3 years to reassess for intestinal metaplasia and dysplasia unless symptomatic.  The patient is aware of the increased risk of intestinal metaplasia and progression to stomach cancer.  The patient agrees to follow-up.\par Diverticulosis: I recommend a low residue diet and avoid seeds. The patient is to avoid Metamucil or fiber supplementation. The patient is to also consider a trial of a probiotic such as Align once a day. \par Internal Hemorrhoids: The patient is to be restarted on a trial of Anusol H. C. suppositories one per rectum nightly and Anusol HC2.5% cream apply to affected area twice a day p.r.n. hemorrhoidal bleeding or pain. \par Family History of Colon Cancer: The patient has a family history of colon cancer. I recommend a repeat colonoscopy in 4 years to reassess for colonic polyps unless symptomatic. The patient agreed and will follow up for the procedure. \par Follow-up: The patient is to follow-up in the office in 3 months to reassess the symptoms. The patient was told to call the office if any further problems.\par \par \par

## 2022-02-23 ENCOUNTER — APPOINTMENT (OUTPATIENT)
Dept: GASTROENTEROLOGY | Facility: CLINIC | Age: 72
End: 2022-02-23

## 2022-03-02 ENCOUNTER — APPOINTMENT (OUTPATIENT)
Dept: GASTROENTEROLOGY | Facility: CLINIC | Age: 72
End: 2022-03-02
Payer: MEDICARE

## 2022-03-02 ENCOUNTER — RX RENEWAL (OUTPATIENT)
Age: 72
End: 2022-03-02

## 2022-03-02 VITALS
HEIGHT: 64 IN | SYSTOLIC BLOOD PRESSURE: 132 MMHG | WEIGHT: 130 LBS | DIASTOLIC BLOOD PRESSURE: 79 MMHG | TEMPERATURE: 97.2 F | BODY MASS INDEX: 22.2 KG/M2 | OXYGEN SATURATION: 98 %

## 2022-03-02 DIAGNOSIS — Z01.818 ENCOUNTER FOR OTHER PREPROCEDURAL EXAMINATION: ICD-10-CM

## 2022-03-02 DIAGNOSIS — E61.1 IRON DEFICIENCY: ICD-10-CM

## 2022-03-02 DIAGNOSIS — K29.30 CHRONIC SUPERFICIAL GASTRITIS W/OUT BLEEDING: ICD-10-CM

## 2022-03-02 PROCEDURE — 99214 OFFICE O/P EST MOD 30 MIN: CPT

## 2022-03-02 RX ORDER — POLYETHYLENE GLYCOL 3350 AND ELECTROLYTES WITH LEMON FLAVOR 236; 22.74; 6.74; 5.86; 2.97 G/4L; G/4L; G/4L; G/4L; G/4L
236 POWDER, FOR SOLUTION ORAL
Qty: 1 | Refills: 0 | Status: ACTIVE | COMMUNITY
Start: 2022-03-02 | End: 1900-01-01

## 2022-03-02 NOTE — ASSESSMENT
[FreeTextEntry1] : Abdominal Pain: The patient complains of abdominal pain. The patient is to avoid nonsteroidal anti-inflammatory drugs and aspirin.  I recommend a trial of Phazyme 1 tablet PO 3 times a day for 3 months for the symptoms.\par Dyspepsia: The patient complains of dyspeptic symptoms.  The patient was advised to continue to abide by an anti-gas (low FOD-MAP) diet.  The patient was previously given a pamphlet for anti-gas (low FOD-MAP).  The patient and I reviewed the anti-gas (low FOD-MAP)diet at length again. The patient is to continue on a trial of Simethicone one tablet 4 times a day p.r.n. abdominal pain and gas.\par GERD: The patient was advised to avoid late-night meals and dietary indiscretions.  The patient was advised to avoid fried and fatty foods.  The patient was advised to abide by an anti-GERD diet. The patient was given a pamphlet for anti-GERD.  The patient and I reviewed the anti-GERD diet at length. I recommend a trial of famotidine 40 mg twice a day x 3 months for the symptoms.\par Iron Deficiency:\par Colonoscopy: I recommend a colonoscopy to assess the symptoms.  The patient was told of the risks and benefits of the procedure.  The patient was told of the risks of perforation, emergency surgery, bleeding, infections and missed lesions.  The patient agreed and will schedule for the procedure. The patient can take the antihypertensive medication with a sip of water one hour prior to the procedure. The patient is to hold the diabetic medication the day before and the morning of the procedure. The patient is to hold the blood thinner medication for 5 days prior to the procedure. The patient is to be n.p.o. after midnight and bowel prep was given.  The patient is to return for the procedure. \par Hiatal Hernia: The patient was advised to avoid late-night meals and dietary indiscretions. The patient was advised to avoid fried and fatty foods. The patient was advised to abide by an anti-GERD diet. The patient was given a pamphlet for anti-GERD. The patient and I reviewed the anti-GERD diet at length. I recommend a trial of Famotidine 40 mg twice a day for 3 months for the symptoms.\par Gastritis: The patient has a history of gastritis. The patient is to avoid nonsteroidal anti-inflammatory drugs and aspirin. I recommend a trial of Famotidine 40 mg twice a day for 3 months for the symptoms.\par Intestinal Metaplasia of the Stomach: The patient was found to have intestinal metaplasia of the stomach on recent upper endoscopy. The findings of intestinal metaplasia of the stomach have an increased risk of gastric cancer. Recent biopsies did not reveal dysplasia. I recommend a repeat upper endoscopy with mapping in 2 years to reassess for intestinal metaplasia and dysplasia unless symptomatic. The patient is aware of the increased risk of intestinal metaplasia and progression to stomach cancer. The patient agrees to follow-up.\par Diverticulosis: I recommend a low residue diet and avoid seeds. The patient is to avoid Metamucil or fiber supplementation. The patient is to also consider a trial of a probiotic such as Align once a day. \par Internal Hemorrhoids: The patient is to consider a trial of Anusol H. C. suppositories one per rectum nightly and Anusol HC2.5% cream apply to affected area twice a day p.r.n. hemorrhoidal bleeding or pain. \par Family History to reassess for colonic polyps. The patient agreed and will follow up for the procedure. \par Follow-up: The patient is to follow-up in the office in 1 month to reassess the symptoms. The patient was told to call the office if any further problems.\par \par

## 2022-03-02 NOTE — HISTORY OF PRESENT ILLNESS
[None] : had no significant interval events [Nausea] : denies nausea [Vomiting] : denies vomiting [Diarrhea] : denies diarrhea [Constipation] : denies constipation [Yellow Skin Or Eyes (Jaundice)] : denies jaundice [Rectal Pain] : denies rectal pain [Wt Loss ___ Lbs] : recent [unfilled] ~Upound(s) weight loss [Heartburn] : heartburn [Abdominal Pain] : abdominal pain [Abdominal Swelling] : abdominal swelling [GERD] : gastroesophageal reflux disease [Wt Gain ___ Lbs] : no recent weight gain [Hiatus Hernia] : no hiatus hernia [Peptic Ulcer Disease] : no peptic ulcer disease [Pancreatitis] : no pancreatitis [Cholelithiasis] : no cholelithiasis [Kidney Stone] : no kidney stone [Inflammatory Bowel Disease] : no inflammatory bowel disease [Irritable Bowel Syndrome] : no irritable bowel syndrome [Diverticulitis] : no diverticulitis [Alcohol Abuse] : no alcohol abuse [Malignancy] : no malignancy [Abdominal Surgery] : no abdominal surgery [Appendectomy] : no appendectomy [Cholecystectomy] : no cholecystectomy [de-identified] : The patient states that she is feeling uncomfortable.   The patient was told of low iron levels on recent blood work.  She is currently on iron supplements. The patient denies any jaundice or pruritus.  The patient denies any chronic lower back pain. The patient complains of abdominal pain.  The patient describes the abdominal pain as a sharp, intermittent midepigastric discomfort that is nonradiating in nature.  The abdominal pain is unrelated to meals or passing gas or having bowel movements.  The abdominal pain is worse with stress.  The abdominal pain is described as mild to moderate in nature.  The abdominal pain occurs at night and in the morning.  The abdominal pain can occur at any time.   The abdominal pain has never awakened the patient from sleep.  The abdominal pain is not relieved with medication.  The abdominal pain is associated with abdominal gas and bloating.  The patient denies any nausea or vomiting.  The patient complains of occasional gastroesophageal reflux disease but denies any dysphagia.  The gastroesophageal reflux disease is worse after meals and late at night and in the early morning. The gastroesophageal reflux disease is improved with proton pump inhibitors, H2 blockers and antacids.   The patient denies any atypical chest pain, shortness of breath or palpitations.  The patient admits to occasional episodes of diaphoresis.  The patient denies any constipation or diarrhea.  The patient has 3 to 4 bowel movements a day. The patient complains of a change in bowel habits.  The patient complains of a change in caliber of stool. The patient denies having mucus discharge with the bowel movements.  The patient complains of dark stools secondary to iron supplements but denies any bright red blood per rectum, melena or hematemesis.  The patient complains of rectal pain and rectal pruritus.  The patient complains of weight loss but denies any anorexia.  The patient admits to losing 6 to 7 pounds over the past 3 months. The patient attributes the weight loss to recent change in diet.  She denies any fevers or chills.  The patient had an upper endoscopy at the [facility] GI endoscopy suite on September 09, 2021. The upper endoscopy was performed up to the level of the second portion of the duodenum. The upper endoscopy revealed a small hiatal hernia and mild diffuse gastritis. Biopsies were taken of the distal esophagus, antrum, body of stomach and duodenum to assess for esophagitis, gastritis and duodenitis. The gastric pathology performed on September 9, 2021 revealed fragments of unremarkable squamous esophageal epithelium with a PASF stain that was negative for fungal microorganisms (esophagus), gastric mucosa with chronic inactive gastritis with foci of incomplete intestinal metaplasia that was negative for Helicobacter pylori (gastric lesser curvature of stomach), gastric mucosa with chronic inactive gastritis that was negative for Helicobacter pylori (greater curvature of the stomach and body and lesser curvature of the cardia), gastric antral mucosa with chronic inactive gastritis with foci of incomplete intestinal metaplasia that was negative for Helicobacter pylori and duodenal mucosa with focal gastric mucin cell metaplasia (antrum) and duodenal mucosa with preserved villous architecture with no parasites or increased intraepithelial lymphocytes identified (duodenum). The patient tolerated the procedure well. The patient had an upper endoscopy and colonoscopy to the terminal ileum performed at the [facility] GI endoscopy suite on July 25, 2019. The upper endoscopy was performed up to the level of the second portion of the duodenum. The upper endoscopy revealed mild diffuse bile gastritis. Biopsies were taken of the distal esophagus, antrum, body of stomach and duodenum to assess for esophagitis, gastritis and duodenitis. The pathology revealed distal esophagus with unremarkable squamous esophageal epithelium with no evidence of fungal microorganisms, gastric antral mucosa with chronic inactive gastritis with focal incomplete intestinal metaplasia that was negative for Helicobacter pylori, gastric body mucosa with unremarkable gastric oxyntic type mucosa that was negative for Helicobacter pylori and unremarkable duodenal mucosa with preserved villous architecture with no evidence of parasites or intraepithelial lymphocytes. The colonoscopy to the terminal ileum revealed scattered right sided diverticulosis, a long and tortuous colon and internal hemorrhoids. Random biopsies were taken of the terminal ileum and cecum to assess for ileitis and microscopic colitis. There were no polyps, masses, AVMs or colitis noted. The pathology revealed unremarkable ileal and cecal mucosa. The patient tolerated the procedures well. The stool studies for C&S, ova and parasite and C. difficile toxin performed June 27, 2019 were negative. The patient states that the symptoms resolved on cholestyramine one packet once a day. The patient is s/p left breast cancer, s/p left lumpectomy and s/p radiation therapy. The patient is being followed by her surgeon, Dr. Lorie Rodriguez and oncologist, Dr. Hawa Begum. The patient is being followed by her cardiologist, Dr. Albert Galvez for her heart. She is currently on Metoprolol. The patient admits to family history of GI problems. The patient's sister had a history of a colonic tumor.  [de-identified] :  (+) prior smoking 3 cigarettes a day x 2 years, stopped x 40 years, (-) ETOH, (-) IVDA

## 2022-03-21 LAB — SARS-COV-2 N GENE NPH QL NAA+PROBE: NOT DETECTED

## 2022-03-22 ENCOUNTER — OUTPATIENT (OUTPATIENT)
Dept: OUTPATIENT SERVICES | Facility: HOSPITAL | Age: 72
LOS: 1 days | End: 2022-03-22
Payer: MEDICARE

## 2022-03-22 ENCOUNTER — APPOINTMENT (OUTPATIENT)
Dept: GASTROENTEROLOGY | Facility: HOSPITAL | Age: 72
End: 2022-03-22

## 2022-03-22 DIAGNOSIS — Z91.89 OTHER SPECIFIED PERSONAL RISK FACTORS, NOT ELSEWHERE CLASSIFIED: ICD-10-CM

## 2022-03-22 DIAGNOSIS — Z98.89 OTHER SPECIFIED POSTPROCEDURAL STATES: Chronic | ICD-10-CM

## 2022-03-22 DIAGNOSIS — E61.1 IRON DEFICIENCY: ICD-10-CM

## 2022-03-22 PROCEDURE — 45378 DIAGNOSTIC COLONOSCOPY: CPT

## 2022-03-22 PROCEDURE — G0105: CPT

## 2022-04-01 ENCOUNTER — RX RENEWAL (OUTPATIENT)
Age: 72
End: 2022-04-01

## 2022-04-01 RX ORDER — FAMOTIDINE 40 MG/1
40 TABLET, FILM COATED ORAL
Qty: 180 | Refills: 3 | Status: ACTIVE | COMMUNITY
Start: 2022-03-02 | End: 1900-01-01

## 2022-05-24 ENCOUNTER — RX RENEWAL (OUTPATIENT)
Age: 72
End: 2022-05-24

## 2022-11-09 ENCOUNTER — RX RENEWAL (OUTPATIENT)
Age: 72
End: 2022-11-09

## 2023-02-12 ENCOUNTER — RX RENEWAL (OUTPATIENT)
Age: 73
End: 2023-02-12

## 2023-05-26 ENCOUNTER — RX RENEWAL (OUTPATIENT)
Age: 73
End: 2023-05-26

## 2023-08-17 ENCOUNTER — RX RENEWAL (OUTPATIENT)
Age: 73
End: 2023-08-17

## 2023-11-15 ENCOUNTER — RX RENEWAL (OUTPATIENT)
Age: 73
End: 2023-11-15

## 2023-11-15 RX ORDER — PANTOPRAZOLE 40 MG/1
40 TABLET, DELAYED RELEASE ORAL
Qty: 90 | Refills: 0 | Status: ACTIVE | COMMUNITY
Start: 2022-11-09 | End: 1900-01-01

## 2024-03-21 RX ORDER — PANTOPRAZOLE 40 MG/1
40 TABLET, DELAYED RELEASE ORAL DAILY
Qty: 30 | Refills: 3 | Status: ACTIVE | COMMUNITY
Start: 2021-08-23 | End: 1900-01-01

## 2024-05-17 ENCOUNTER — APPOINTMENT (OUTPATIENT)
Dept: GASTROENTEROLOGY | Facility: CLINIC | Age: 74
End: 2024-05-17
Payer: MEDICARE

## 2024-05-17 VITALS
BODY MASS INDEX: 22.36 KG/M2 | OXYGEN SATURATION: 98 % | HEIGHT: 64 IN | WEIGHT: 131 LBS | SYSTOLIC BLOOD PRESSURE: 147 MMHG | DIASTOLIC BLOOD PRESSURE: 79 MMHG | TEMPERATURE: 97.2 F | HEART RATE: 86 BPM

## 2024-05-17 DIAGNOSIS — K21.9 GASTRO-ESOPHAGEAL REFLUX DISEASE W/OUT ESOPHAGITIS: ICD-10-CM

## 2024-05-17 DIAGNOSIS — K44.9 DIAPHRAGMATIC HERNIA W/OUT OBSTRUCTION OR GANGRENE: ICD-10-CM

## 2024-05-17 DIAGNOSIS — R10.13 EPIGASTRIC PAIN: ICD-10-CM

## 2024-05-17 DIAGNOSIS — Z91.89 OTHER SPECIFIED PERSONAL RISK FACTORS, NOT ELSEWHERE CLASSIFIED: ICD-10-CM

## 2024-05-17 DIAGNOSIS — K31.A0 GASTRIC INTESTINAL METAPLASIA, UNSPECIFIED: ICD-10-CM

## 2024-05-17 PROCEDURE — 99214 OFFICE O/P EST MOD 30 MIN: CPT

## 2024-05-17 PROCEDURE — 99204 OFFICE O/P NEW MOD 45 MIN: CPT

## 2024-05-17 RX ORDER — SIMETHICONE 180 MG
180 CAPSULE ORAL 4 TIMES DAILY
Qty: 120 | Refills: 3 | Status: ACTIVE | COMMUNITY
Start: 2022-03-02 | End: 1900-01-01

## 2024-05-17 RX ORDER — PANTOPRAZOLE 40 MG/1
40 TABLET, DELAYED RELEASE ORAL
Qty: 90 | Refills: 0 | Status: ACTIVE | COMMUNITY
Start: 2022-05-24 | End: 1900-01-01

## 2024-05-17 NOTE — ASSESSMENT
[FreeTextEntry1] : Dyspepsia: The patient complains of dyspeptic symptoms.  The patient was advised to abide by an anti-gas (low FOD-MAP) diet.  The patient was given a pamphlet for anti-gas (low FOD-MAP).  The patient and I reviewed the anti-gas (low FOD-MAP) diet at length. The patient is to start on a trial of Simethicone one tablet 4 times a day p.r.n. abdominal pain and gas. GERD: The patient was advised to avoid late-night meals and dietary indiscretions.  The patient was advised to avoid fried and fatty foods.  The patient was advised to abide by an anti-GERD diet. The patient was given a pamphlet for anti-GERD.  The patient and I reviewed the anti-GERD diet at length. I recommend a trial of Pantoprazole 40 mg once a day x 3 months for the symptoms. Diverticulosis: I recommend a high-fiber diet and avoid seeds. The patient is to consider a trial of Metamucil once a day for fiber supplementation. The patient is to also consider a trial of a probiotic such as Align once a day. The patient and I discussed the potential risk of diverticulitis and diverticular bleeding secondary to diverticular disease. The patient is aware of the importance of follow-up if these complications arise. Internal Hemorrhoids: The patient is to consider starting a trial of Anusol H. C. suppositories one per rectum nightly and Anusol HC2 .5% cream apply to affected area twice a day p.r.n. hemorrhoidal bleeding or pain. I also recommend a trial of Calmoseptine cream apply to affected area twice a day for hemorrhoidal discomfort.  I recommend Tucks pads for the hemorrhoids.  I recommend Sitz baths twice a day for the hemorrhoids.  I recommend avoid wearing tight underwear and use boxers.  I recommend avoid touching the perineal area.  If the symptoms persist, I recommend a surgical evaluation for possible band ligation of the hemorrhoids. The patient was given the name of colorectal surgeon, Dr. Jason Paredes at [facility] [place], [place] for possible surgery.   The patient agreed to followup.  Hiatal Hernia: The patient was advised to avoid late-night meals and dietary indiscretions. The patient was advised to avoid fried and fatty foods. The patient was advised to abide by an anti-GERD diet. The patient was given a pamphlet for anti-GERD. The patient and I reviewed the anti-GERD diet at length. I recommend a trial of Famotidine 40 mg twice a day for 3 months for the symptoms. Gastritis: The patient has a history of gastritis. The patient is to avoid nonsteroidal anti-inflammatory drugs and aspirin. I recommend a trial of Famotidine 40 mg twice a day for 3 months for the symptoms. Intestinal Metaplasia of the Stomach: The patient was found to have intestinal metaplasia of the stomach on recent upper endoscopy. The findings of intestinal metaplasia of the stomach have an increased risk of gastric cancer. Recent biopsies did not reveal dysplasia. I recommend a repeat upper endoscopy with mapping in 1 year to reassess for intestinal metaplasia and dysplasia unless symptomatic. The patient is aware of the increased risk of intestinal metaplasia and progression to stomach cancer. The patient agrees to follow-up. Family History of GI Malignancy: The patient admits to family history of GI problems. The patient's sister had a history of a GYN tumor involving colon.   Upper Endoscopy: I recommend an upper endoscopy to assess for peptic ulcer disease versus esophagitis.  The patient was told of the risks and benefits of the procedure.  The patient was told of the risks of perforation, emergency surgery, bleeding, infections and missed lesions. The patient is told not to drive, drink alcohol, use recreational drugs, exercise, or work the day of the procedure.  The patient was told of the need for an escort to accompany the patient home after the procedure. The patient is aware that the procedure may be cancelled if they fail to follow the directions.  The patient agreed and will schedule for the procedure. The patient can take the antihypertensive medication with a sip of water one hour prior to the procedure. The patient is to be n.p.o. after midnight.  The patient is to return for the procedure. Follow-up: The patient is to follow-up in the office in 4 weeks to reassess the symptoms. The patient was told to call the office if any further problems.

## 2024-05-17 NOTE — HISTORY OF PRESENT ILLNESS
[de-identified] : The upper endoscopy performed at the [facility] [place] [facility] GI endoscopy suite on September 09, 2021 revealed a small hiatal hernia and mild diffuse gastritis. The gastric pathology performed on September 9, 2021 revealed fragments of unremarkable squamous esophageal epithelium with a PASF stain that was negative for fungal microorganisms (esophagus), gastric mucosa with chronic inactive gastritis with foci of incomplete intestinal metaplasia that was negative for Helicobacter pylori (gastric lesser curvature of stomach), gastric mucosa with chronic inactive gastritis that was negative for Helicobacter pylori (greater curvature of the stomach and body and lesser curvature of the cardia), gastric antral mucosa with chronic inactive gastritis with foci of incomplete intestinal metaplasia that was negative for Helicobacter pylori and duodenal mucosa with focal gastric mucin cell metaplasia (antrum) and duodenal mucosa with preserved villous architecture with no parasites or increased intraepithelial lymphocytes identified (duodenum).  The upper endoscopy performed at the [facility] [place] endoscopy suite on July 25, 2019 revealed mild diffuse bile gastritis. The pathology revealed distal esophagus with unremarkable squamous esophageal epithelium with no evidence of fungal microorganisms, gastric antral mucosa with chronic inactive gastritis with focal incomplete intestinal metaplasia that was negative for Helicobacter pylori, gastric body mucosa with unremarkable gastric oxyntic type mucosa that was negative for Helicobacter pylori and unremarkable duodenal mucosa with preserved villous architecture with no evidence of parasites or intraepithelial lymphocytes.   [FreeTextEntry1] : The colonoscopy to the terminal ileum performed at the [facility] [place] GI endoscopy suite on March 22, 2022 revealed moderate left sided diverticulosis and small internal hemorrhoids.  There were no polyps, masses, AVMs or colitis noted.   The colonoscopy to the terminal ileum performed at the [facility] endoscopy suite on July 25, 2019 revealed scattered right sided diverticulosis, a long and tortuous colon and internal hemorrhoids. There were no polyps, masses, AVMs or colitis noted. The pathology revealed unremarkable ileal and cecal mucosa.

## 2024-10-03 ENCOUNTER — OUTPATIENT (OUTPATIENT)
Dept: OUTPATIENT SERVICES | Facility: HOSPITAL | Age: 74
LOS: 1 days | End: 2024-10-03

## 2024-10-03 ENCOUNTER — APPOINTMENT (OUTPATIENT)
Dept: GASTROENTEROLOGY | Facility: HOSPITAL | Age: 74
End: 2024-10-03

## 2024-10-03 DIAGNOSIS — Z98.89 OTHER SPECIFIED POSTPROCEDURAL STATES: Chronic | ICD-10-CM

## 2024-10-03 DIAGNOSIS — K21.9 GASTRO-ESOPHAGEAL REFLUX DISEASE WITHOUT ESOPHAGITIS: ICD-10-CM

## 2024-10-03 RX ORDER — SODIUM CHLORIDE 0.9 % (FLUSH) 0.9 %
500 SYRINGE (ML) INJECTION
Refills: 0 | Status: ACTIVE | OUTPATIENT
Start: 2024-10-03

## 2024-10-03 NOTE — CHART NOTE - NSCHARTNOTEFT_GEN_A_CORE
History of Present Illness       The patient is a 74-year-old female with past medical history significant for hypertension, hypercholesterolemia, hypothyroidism, osteopenia and left breast cancer, s/p lumpectomy, s/p radiation therapy who was referred to my office by Dr. Ruby for dyspepsia and gastroesophageal reflux disease. The patient admits to family history of GI problems. The patient's sister had a history of a colonic tumor.  I was asked to render an opinion for consultation for the above complaints. The patient states that she is feeling uncomfortable x 2 months. The patient denies any abdominal pain. The patient complains of abdominal gas and bloating. The patient denies any nausea or vomiting. The patient complains of gastroesophageal reflux disease but denies any dysphagia. The gastroesophageal reflux disease is worse after meals and during the day. The gastroesophageal reflux disease is not improved with proton pump inhibitors, H2 blockers and antacids. The patient denies any atypical chest pain, shortness of breath or palpitations. The patient admits to occasional episodes of diaphoresis. The patient denies any constipation or diarrhea. The patient has 1 to 2 bowel movements a day. The patient denies a change in bowel habits. The patient denies a change in caliber of stool. The patient denies having mucus discharge with the bowel movements. The patient denies any bright red blood per rectum, melena or hematemesis. The patient denies any rectal pain or rectal pruritus. The patient denies any weight loss or anorexia. The patient admits to gaining weight recently. She denies any fevers or chills. The patient denies any jaundice or pruritus. The patient complains of chronic lower back pain. The patient had a colonoscopy to the terminal ileum performed at the [facility] on 2022. The colonoscopy to the terminal ileum revealed moderate left sided diverticulosis and small internal hemorrhoids. There were no polyps, masses, AVMs or colitis noted. The patient tolerated the procedure well. The patient had an upper endoscopy at the [facility] on 2021. The upper endoscopy was performed up to the level of the second portion of the duodenum. The upper endoscopy revealed a small hiatal hernia and mild diffuse gastritis. Biopsies were taken of the distal esophagus, antrum, body of stomach and duodenum to assess for esophagitis, gastritis and duodenitis. The gastric pathology performed on 2021 revealed fragments of unremarkable squamous esophageal epithelium with a PASF stain that was negative for fungal microorganisms (esophagus), gastric mucosa with chronic inactive gastritis with foci of incomplete intestinal metaplasia that was negative for Helicobacter pylori (gastric lesser curvature of stomach), gastric mucosa with chronic inactive gastritis that was negative for Helicobacter pylori (greater curvature of the stomach and body and lesser curvature of the cardia), gastric antral mucosa with chronic inactive gastritis with foci of incomplete intestinal metaplasia that was negative for Helicobacter pylori and duodenal mucosa with focal gastric mucin cell metaplasia (antrum) and duodenal mucosa with preserved villous architecture with no parasites or increased intraepithelial lymphocytes identified (duodenum). The patient tolerated the procedure well. The patient had an upper endoscopy and colonoscopy to the terminal ileum performed at the [facility] on 2019. The upper endoscopy was performed up to the level of the second portion of the duodenum. The upper endoscopy revealed mild diffuse bile gastritis. Biopsies were taken of the distal esophagus, antrum, body of stomach and duodenum to assess for esophagitis, gastritis and duodenitis. The pathology revealed distal esophagus with unremarkable squamous esophageal epithelium with no evidence of fungal microorganisms, gastric antral mucosa with chronic inactive gastritis with focal incomplete intestinal metaplasia that was negative for Helicobacter pylori, gastric body mucosa with unremarkable gastric oxyntic type mucosa that was negative for Helicobacter pylori and unremarkable duodenal mucosa with preserved villous architecture with no evidence of parasites or intraepithelial lymphocytes. The colonoscopy to the terminal ileum revealed scattered right sided diverticulosis, a long and tortuous colon and internal hemorrhoids. Random biopsies were taken of the terminal ileum and cecum to assess for ileitis and microscopic colitis. There were no polyps, masses, AVMs or colitis noted. The pathology revealed unremarkable ileal and cecal mucosa. The patient tolerated the procedures well. The stool studies for C&S, ova and parasite and C. difficile toxin performed 2019 were negative. The patient states that the symptoms resolved on cholestyramine one packet once a day. The patient is s/p left breast cancer, s/p left lumpectomy and s/p radiation therapy. The patient is being followed by her surgeon, Dr. Lorie Rodriguez and oncologist, Dr. Hawa Begum. The patient is being followed by her cardiologist, Dr. Albert Galvez for her heart. She is currently on Metoprolol. The patient denies having a recent upper endoscopy and colonoscopy performed by another gastroenterologist. The patient's last menstrual period was age 57. The patient is a . The patient's first menstrual period was unknown. The patient admits to family history of GI problems. The patient's sister had a history of a colonic tumor.  ?  (+) prior smoking 3 cigarettes a day x 2 years, stopped x 40 years, (-) ETOH, (-) IVDA  ?  Physical Exam:  General Appearance: Well developed, well nourished, no acute distress  ENT: nose clear, ears unremarkable  Eyes: No enteric sclera, conjunctiva clear.  Neck: Supple, without masses  Respiratory: Breath sounds equal and bilateral, no wheezing no rales or rhonchi  Cardiovascular: S1-S2 audible, no murmur, no rubs or gallops  GI: (+) BS, soft, nontender, no rebound, no guarding, no masses  Liver: liver edge palpated  Rectal: not done  Musculo-skeletal: Good motor strength, good range of motion, normal appearing extremities  Skin: Normal appearing skin, no jaundice, no rashes or nodules  Neurological: without focal motor or sensory deficits Patient is moving all extremities spontaneously and to command with normal muscle strength alert and oriented X3  Psychiatric: Good affect, not depressed, not anxious      Gastroenterology Summary    Upper Endoscopy: The upper endoscopy performed at the [facility] on 2021 revealed a small hiatal hernia and mild diffuse gastritis. The gastric pathology performed on 2021 revealed fragments of unremarkable squamous esophageal epithelium with a PASF stain that was negative for fungal microorganisms (esophagus), gastric mucosa with chronic inactive gastritis with foci of incomplete intestinal metaplasia that was negative for Helicobacter pylori (gastric lesser curvature of stomach), gastric mucosa with chronic inactive gastritis that was negative for Helicobacter pylori (greater curvature of the stomach and body and lesser curvature of the cardia), gastric antral mucosa with chronic inactive gastritis with foci of incomplete intestinal metaplasia that was negative for Helicobacter pylori and duodenal mucosa with focal gastric mucin cell metaplasia (antrum) and duodenal mucosa with preserved villous architecture with no parasites or increased intraepithelial lymphocytes identified (duodenum).  The upper endoscopy performed at the [facility] on 2019 revealed mild diffuse bile gastritis. The pathology revealed distal esophagus with unremarkable squamous esophageal epithelium with no evidence of fungal microorganisms, gastric antral mucosa with chronic inactive gastritis with focal incomplete intestinal metaplasia that was negative for Helicobacter pylori, gastric body mucosa with unremarkable gastric oxyntic type mucosa that was negative for Helicobacter pylori and unremarkable duodenal mucosa with preserved villous architecture with no evidence of parasites or intraepithelial lymphocytes.    Colonoscopy: The colonoscopy to the terminal ileum performed at the [facility] on 2022 revealed moderate left sided diverticulosis and small internal hemorrhoids. There were no polyps, masses, AVMs or colitis noted.  The colonoscopy to the terminal ileum performed at the [facility] on 2019 revealed scattered right sided diverticulosis, a long and tortuous colon and internal hemorrhoids. There were no polyps, masses, AVMs or colitis noted. The pathology revealed unremarkable ileal and cecal mucosa.  ?  Active Problems  Abdominal pain, diffuse (789.00) (R10.84)  Abdominal pain, epigastric (789.06) (R10.13)  Angiodysplasia of colon (569.84) (K55.20)  Chronic superficial gastritis without bleeding (535.10) (K29.30)  Diarrhea, unspecified type (787.91) (R19.7)  Diverticulosis of colon (562.10) (K57.30)  Dyspepsia (536.8) (R10.13)  Functional dyspepsia (536.8) (K30)  GERD without esophagitis (530.81) (K21.9)  Hiatal hernia (553.3) (K44.9)  High risk for colon cancer (V49.89) (Z91.89)  History of rectal bleeding (V12.79) (Z87.19)  Internal hemorrhoids (455.0) (K64.8)  Intestinal metaplasia of gastric mucosa (537.89) (K31.A0)  Iron deficiency (280.9) (E61.1)  Preop testing (V72.84) (Z01.818)  Preoperative examination (V72.84) (Z01.818)  Rectal pain (569.42) (K62.89)           ?  Past Medical History  History of high cholesterol (V12.29) (Z86.39)  History of hypertension (V12.59) (Z86.79)  History of thyroid disorder (V12.29) (Z86.39)           ?  Surgical History  History of  section  History of Thyroidectomy           ?  Social History  Does not use illicit drugs (V49.89) (Z78.9)  Former smoker (V15.82) (Z87.891)  No alcohol use  No illicit drug use  None           ?  Current Meds  Anusol-HC 25 MG Rectal Suppository; INSERT 1 SUPPOSITORY RECTALLY AT  BEDTIME AS NEEDED  Cholestyramine 4 GM Oral Packet; MIX THE CONTENTS OF 1 POWDER PACKET WITH 2  TO 6 OZ OF NONCARBONATED BEVERAGE AND DRINK 3 TIMES DAILY  clonazePAM 0.5 MG Oral Tablet  Crestor TABS  Famotidine 40 MG Oral Tablet; TAKE 1 TABLET BY MOUTH TWICE A DAY  Famotidine 40 MG Oral Tablet; TAKE 1 TABLET BY MOUTH TWICE DAILY  Famotidine 40 MG Oral Tablet; Take 1 tablet twice daily  Hydrocortisone (Perianal) 2.5 % External Cream; INSERT 1 APPLICATORFUL RECTALLY  AT BEDTIME AS NEEDED  levoFLOXacin 750 MG Oral Tablet  Liothyronine Sodium 5 MCG Oral Tablet  Metoprolol Tartrate 25 MG Oral Tablet  Pantoprazole Sodium 40 MG Oral Tablet Delayed Release; TAKE 1 TABLET BY MOUTH  EVERY DAY  Pantoprazole Sodium 40 MG Oral Tablet Delayed Release; TAKE 1 TABLET BY MOUTH  EVERY DAY  Pantoprazole Sodium 40 MG Oral Tablet Delayed Release; TAKE 1 TABLET DAILY  Pantoprazole Sodium 40 MG Oral Tablet Delayed Release; TAKE 1 TABLET DAILY  PEG 3350-KCl-Na Bicarb-NaCl 420 GM Oral Solution Reconstituted; TAKE AS  DIRECTED  PEG-3350/Electrolytes 236 GM Oral Solution Reconstituted; MIX AS DIRECTED AND  DRINK OVER 4 HOURS, START AT 4PM  Prolia 60 MG/ML Subcutaneous Solution Prefilled Syringe  Simethicone 180 MG Oral Capsule; TAKE 1 CAPSULE 4 TIMES DAILY AS NEEDED  Telmisartan 40 MG Oral Tablet           Allergies  No Known Drug Allergies           ?  Vitals  Vital Signs  ?  Recorded: [id number] 09:11AM  Systolic  147, LLE, Sitting  Diastolic  79, LLE, Sitting  Height  5 ft 4 in  Weight  131 lb  BMI Calculated  22.49 kg/m2  BSA Calculated  1.63 m2  Temperature  97.2 F, Temporal  Heart Rate  86  O2 Saturation  98 %, Room Air  FiO2 Flow Rate  0 L/min, Room Air           ?  Assessment  GERD without esophagitis (530.81) (K21.9)  Hiatal hernia (553.3) (K44.9)  Intestinal metaplasia of gastric mucosa (537.89) (K31.A0)  Dyspepsia (536.8) (R10.13)  High risk for colon cancer (V49.89) (Z91.89)    Dyspepsia: The patient complains of dyspeptic symptoms. The patient was advised to abide by an anti-gas (low FOD-MAP) diet. The patient was given a pamphlet for anti-gas (low FOD-MAP). The patient and I reviewed the anti-gas (low FOD-MAP) diet at length. The patient is to start on a trial of Simethicone one tablet 4 times a day p.r.n. abdominal pain and gas.  GERD: The patient was advised to avoid late-night meals and dietary indiscretions. The patient was advised to avoid fried and fatty foods. The patient was advised to abide by an anti-GERD diet. The patient was given a pamphlet for anti-GERD. The patient and I reviewed the anti-GERD diet at length. I recommend a trial of Pantoprazole 40 mg once a day x 3 months for the symptoms.  Diverticulosis: I recommend a high-fiber diet and avoid seeds. The patient is to consider a trial of Metamucil once a day for fiber supplementation. The patient is to also consider a trial of a probiotic such as Align once a day. The patient and I discussed the potential risk of diverticulitis and diverticular bleeding secondary to diverticular disease. The patient is aware of the importance of follow-up if these complications arise.  Internal Hemorrhoids: The patient is to consider starting a trial of Anusol H. C. suppositories one per rectum nightly and Anusol HC2.5% cream apply to affected area twice a day p.r.n. hemorrhoidal bleeding or pain. I also recommend a trial of Calmoseptine cream apply to affected area twice a day for hemorrhoidal discomfort. I recommend Tucks pads for the hemorrhoids. I recommend Sitz baths twice a day for the hemorrhoids. I recommend avoid wearing tight underwear and use boxers. I recommend avoid touching the perineal area. If the symptoms persist, I recommend a surgical evaluation for possible band ligation of the hemorrhoids. The patient was given the name of colorectal surgeon, Dr. Jason Paredes at [facility] for possible surgery. The patient agreed to followup.  Hiatal Hernia: The patient was advised to avoid late-night meals and dietary indiscretions. The patient was advised to avoid fried and fatty foods. The patient was advised to abide by an anti-GERD diet. The patient was given a pamphlet for anti-GERD. The patient and I reviewed the anti-GERD diet at length. I recommend a trial of Famotidine 40 mg twice a day for 3 months for the symptoms.  Gastritis: The patient has a history of gastritis. The patient is to avoid nonsteroidal anti-inflammatory drugs and aspirin. I recommend a trial of Famotidine 40 mg twice a day for 3 months for the symptoms.  Intestinal Metaplasia of the Stomach: The patient was found to have intestinal metaplasia of the stomach on recent upper endoscopy. The findings of intestinal metaplasia of the stomach have an increased risk of gastric cancer. Recent biopsies did not reveal dysplasia. I recommend a repeat upper endoscopy with mapping in 1 year to reassess for intestinal metaplasia and dysplasia unless symptomatic. The patient is aware of the increased risk of intestinal metaplasia and progression to stomach cancer. The patient agrees to follow-up.  Family History of GI Malignancy: The patient admits to family history of GI problems. The patient's sister had a history of a GYN tumor involving colon.  Upper Endoscopy: I recommend an upper endoscopy to assess for peptic ulcer disease versus esophagitis. The patient was told of the risks and benefits of the procedure. The patient was told of the risks of perforation, emergency surgery, bleeding, infections and missed lesions. The patient is told not to drive, drink alcohol, use recreational drugs, exercise, or work the day of the procedure. The patient was told of the need for an escort to accompany the patient home after the procedure. The patient is aware that the procedure may be cancelled if they fail to follow the directions. The patient agreed and will schedule for the procedure. The patient can take the antihypertensive medication with a sip of water one hour prior to the procedure. The patient is to be n.p.o. after midnight. The patient is to return for the procedure.  Follow-up: The patient is to follow-up in the office in 4 weeks to reassess the symptoms. The patient was told to call the office if any further problems.            ?  Plan  Abdominal pain, diffuse, Dyspepsia  Renew: Simethicone 180 MG Oral Capsule; TAKE 1 CAPSULE 4 TIMES DAILY AS  NEEDED  Abdominal pain, diffuse, Functional dyspepsia, GERD without esophagitis  Renew: Pantoprazole Sodium 40 MG Oral Tablet Delayed Release; TAKE 1 TABLET BY  MOUTH EVERY DAY  GERD without esophagitis  Upper GI Endoscopy; Status:Active; Requested for:[id number];           ?

## 2024-10-23 ENCOUNTER — NON-APPOINTMENT (OUTPATIENT)
Age: 74
End: 2024-10-23

## 2024-10-23 RX ORDER — SUCRALFATE 1 G/10ML
1 SUSPENSION ORAL
Qty: 1200 | Refills: 1 | Status: ACTIVE | COMMUNITY
Start: 2024-10-23 | End: 1900-01-01

## 2024-11-14 ENCOUNTER — RESULT REVIEW (OUTPATIENT)
Age: 74
End: 2024-11-14

## 2024-11-14 ENCOUNTER — TRANSCRIPTION ENCOUNTER (OUTPATIENT)
Age: 74
End: 2024-11-14

## 2024-11-14 ENCOUNTER — APPOINTMENT (OUTPATIENT)
Dept: GASTROENTEROLOGY | Facility: HOSPITAL | Age: 74
End: 2024-11-14

## 2024-11-14 ENCOUNTER — OUTPATIENT (OUTPATIENT)
Dept: OUTPATIENT SERVICES | Facility: HOSPITAL | Age: 74
LOS: 1 days | End: 2024-11-14
Payer: MEDICARE

## 2024-11-14 VITALS
DIASTOLIC BLOOD PRESSURE: 75 MMHG | TEMPERATURE: 98 F | RESPIRATION RATE: 14 BRPM | HEART RATE: 81 BPM | OXYGEN SATURATION: 100 % | SYSTOLIC BLOOD PRESSURE: 141 MMHG | HEIGHT: 64 IN | WEIGHT: 130.07 LBS

## 2024-11-14 VITALS
OXYGEN SATURATION: 100 % | SYSTOLIC BLOOD PRESSURE: 102 MMHG | DIASTOLIC BLOOD PRESSURE: 67 MMHG | RESPIRATION RATE: 21 BRPM | HEART RATE: 71 BPM

## 2024-11-14 DIAGNOSIS — K21.9 GASTRO-ESOPHAGEAL REFLUX DISEASE WITHOUT ESOPHAGITIS: ICD-10-CM

## 2024-11-14 DIAGNOSIS — E89.0 POSTPROCEDURAL HYPOTHYROIDISM: Chronic | ICD-10-CM

## 2024-11-14 DIAGNOSIS — Z98.890 OTHER SPECIFIED POSTPROCEDURAL STATES: Chronic | ICD-10-CM

## 2024-11-14 DIAGNOSIS — Z98.89 OTHER SPECIFIED POSTPROCEDURAL STATES: Chronic | ICD-10-CM

## 2024-11-14 PROCEDURE — 88305 TISSUE EXAM BY PATHOLOGIST: CPT | Mod: 26

## 2024-11-14 PROCEDURE — 43239 EGD BIOPSY SINGLE/MULTIPLE: CPT

## 2024-11-14 PROCEDURE — 88312 SPECIAL STAINS GROUP 1: CPT | Mod: 26

## 2024-11-14 PROCEDURE — 88312 SPECIAL STAINS GROUP 1: CPT

## 2024-11-14 PROCEDURE — 88305 TISSUE EXAM BY PATHOLOGIST: CPT

## 2024-11-14 RX ORDER — SUCRALFATE 1 G/1
1 TABLET ORAL 4 TIMES DAILY
Qty: 120 | Refills: 1 | Status: ACTIVE | COMMUNITY
Start: 2024-11-14 | End: 1900-01-01

## 2024-11-14 RX ORDER — SODIUM CHLORIDE 9 MG/ML
500 INJECTION, SOLUTION INTRAMUSCULAR; INTRAVENOUS; SUBCUTANEOUS
Refills: 0 | Status: ACTIVE | OUTPATIENT
Start: 2024-11-14

## 2024-11-14 NOTE — ASU DISCHARGE PLAN (ADULT/PEDIATRIC) - FINANCIAL ASSISTANCE
[facility] provides services at a reduced cost to those who are determined to be eligible through [facility]’s financial assistance program. Information regarding [facility]’s financial assistance program can be found by going to [email or web address] or by calling [phone].

## 2024-11-14 NOTE — ASU PATIENT PROFILE, ADULT - NSICDXPASTSURGICALHX_GEN_ALL_CORE_FT
PAST SURGICAL HISTORY:  H/O breast biopsy LEFT    H/O thyroidectomy     History of breast lump removal LEFT    History of

## 2024-11-14 NOTE — CHART NOTE - NSCHARTNOTEFT_GEN_A_CORE
History of Present Illness       The patient is a 74-year-old female with past medical history significant for hypertension, hypercholesterolemia, hypothyroidism, osteopenia and left breast cancer, s/p lumpectomy, s/p radiation therapy who was referred to my office by Dr. Evelyn Ruby for dyspepsia and gastroesophageal reflux disease. The patient admits to family history of GI problems. The patient's sister had a history of a colonic tumor.  I was asked to render an opinion for consultation for the above complaints. The patient states that she is feeling uncomfortable x 2 months. The patient denies any abdominal pain. The patient complains of abdominal gas and bloating. The patient denies any nausea or vomiting. The patient complains of gastroesophageal reflux disease but denies any dysphagia. The gastroesophageal reflux disease is worse after meals and during the day. The gastroesophageal reflux disease is not improved with proton pump inhibitors, H2 blockers and antacids. The patient denies any atypical chest pain, shortness of breath or palpitations. The patient admits to occasional episodes of diaphoresis. The patient denies any constipation or diarrhea. The patient has 1 to 2 bowel movements a day. The patient denies a change in bowel habits. The patient denies a change in caliber of stool. The patient denies having mucus discharge with the bowel movements. The patient denies any bright red blood per rectum, melena or hematemesis. The patient denies any rectal pain or rectal pruritus. The patient denies any weight loss or anorexia. The patient admits to gaining weight recently. She denies any fevers or chills. The patient denies any jaundice or pruritus. The patient complains of chronic lower back pain. The patient had a colonoscopy to the terminal ileum performed at the [facility] endoscopy suite on 2022. The colonoscopy to the terminal ileum revealed moderate left sided diverticulosis and small internal hemorrhoids. There were no polyps, masses, AVMs or colitis noted. The patient tolerated the procedure well. The patient had an upper endoscopy at the [facility] endoscopy suite on 2021. The upper endoscopy was performed up to the level of the second portion of the duodenum. The upper endoscopy revealed a small hiatal hernia and mild diffuse gastritis. Biopsies were taken of the distal esophagus, antrum, body of stomach and duodenum to assess for esophagitis, gastritis and duodenitis. The gastric pathology performed on 2021 revealed fragments of unremarkable squamous esophageal epithelium with a PASF stain that was negative for fungal microorganisms (esophagus), gastric mucosa with chronic inactive gastritis with foci of incomplete intestinal metaplasia that was negative for Helicobacter pylori (gastric lesser curvature of stomach), gastric mucosa with chronic inactive gastritis that was negative for Helicobacter pylori (greater curvature of the stomach and body and lesser curvature of the cardia), gastric antral mucosa with chronic inactive gastritis with foci of incomplete intestinal metaplasia that was negative for Helicobacter pylori and duodenal mucosa with focal gastric mucin cell metaplasia (antrum) and duodenal mucosa with preserved villous architecture with no parasites or increased intraepithelial lymphocytes identified (duodenum). The patient tolerated the procedure well. The patient had an upper endoscopy and colonoscopy to the terminal ileum performed at the [facility] endoscopy suite on 2019. The upper endoscopy was performed up to the level of the second portion of the duodenum. The upper endoscopy revealed mild diffuse bile gastritis. Biopsies were taken of the distal esophagus, antrum, body of stomach and duodenum to assess for esophagitis, gastritis and duodenitis. The pathology revealed distal esophagus with unremarkable squamous esophageal epithelium with no evidence of fungal microorganisms, gastric antral mucosa with chronic inactive gastritis with focal incomplete intestinal metaplasia that was negative for Helicobacter pylori, gastric body mucosa with unremarkable gastric oxyntic type mucosa that was negative for Helicobacter pylori and unremarkable duodenal mucosa with preserved villous architecture with no evidence of parasites or intraepithelial lymphocytes. The colonoscopy to the terminal ileum revealed scattered right sided diverticulosis, a long and tortuous colon and internal hemorrhoids. Random biopsies were taken of the terminal ileum and cecum to assess for ileitis and microscopic colitis. There were no polyps, masses, AVMs or colitis noted. The pathology revealed unremarkable ileal and cecal mucosa. The patient tolerated the procedures well. The stool studies for C&S, ova and parasite and C. difficile toxin performed 2019 were negative. The patient states that the symptoms resolved on cholestyramine one packet once a day. The patient is s/p left breast cancer, s/p left lumpectomy and s/p radiation therapy. The patient is being followed by her surgeon, Dr. Lorie Rodriguez and oncologist, Dr. Hawa Begum. The patient is being followed by her cardiologist, Dr. Albert Galvez for her heart. She is currently on Metoprolol. The patient denies having a recent upper endoscopy and colonoscopy performed by another gastroenterologist. The patient's last menstrual period was age 57. The patient is a . The patient's first menstrual period was unknown. The patient admits to family history of GI problems. The patient's sister had a history of a colonic tumor.  ?  (+) prior smoking 3 cigarettes a day x 2 years, stopped x 40 years, (-) ETOH, (-) IVDA  ?  Physical Exam:  General Appearance: Well developed, well nourished, no acute distress  ENT: nose clear, ears unremarkable  Eyes: No enteric sclera, conjunctiva clear.  Neck: Supple, without masses  Respiratory: Breath sounds equal and bilateral, no wheezing no rales or rhonchi  Cardiovascular: S1-S2 audible, no murmur, no rubs or gallops  GI: (+) BS, soft, nontender, no rebound, no guarding, no masses  Liver: liver edge palpated  Rectal: not done  Musculo-skeletal: Good motor strength, good range of motion, normal appearing extremities  Skin: Normal appearing skin, no jaundice, no rashes or nodules  Neurological: without focal motor or sensory deficits Patient is moving all extremities spontaneously and to command with normal muscle strength alert and oriented X3  Psychiatric: Good affect, not depressed, not anxious      Gastroenterology Summary    Upper Endoscopy: The upper endoscopy performed at the [facility] endoscopy suite on 2021 revealed a small hiatal hernia and mild diffuse gastritis. The gastric pathology performed on 2021 revealed fragments of unremarkable squamous esophageal epithelium with a PASF stain that was negative for fungal microorganisms (esophagus), gastric mucosa with chronic inactive gastritis with foci of incomplete intestinal metaplasia that was negative for Helicobacter pylori (gastric lesser curvature of stomach), gastric mucosa with chronic inactive gastritis that was negative for Helicobacter pylori (greater curvature of the stomach and body and lesser curvature of the cardia), gastric antral mucosa with chronic inactive gastritis with foci of incomplete intestinal metaplasia that was negative for Helicobacter pylori and duodenal mucosa with focal gastric mucin cell metaplasia (antrum) and duodenal mucosa with preserved villous architecture with no parasites or increased intraepithelial lymphocytes identified (duodenum).  The upper endoscopy performed at the [facility] endoscopy suite on 2019 revealed mild diffuse bile gastritis. The pathology revealed distal esophagus with unremarkable squamous esophageal epithelium with no evidence of fungal microorganisms, gastric antral mucosa with chronic inactive gastritis with focal incomplete intestinal metaplasia that was negative for Helicobacter pylori, gastric body mucosa with unremarkable gastric oxyntic type mucosa that was negative for Helicobacter pylori and unremarkable duodenal mucosa with preserved villous architecture with no evidence of parasites or intraepithelial lymphocytes.    Colonoscopy: The colonoscopy to the terminal ileum performed at the [facility] endoscopy suite on 2022 revealed moderate left sided diverticulosis and small internal hemorrhoids. There were no polyps, masses, AVMs or colitis noted.  The colonoscopy to the terminal ileum performed at the [facility] endoscopy suite on 2019 revealed scattered right sided diverticulosis, a long and tortuous colon and internal hemorrhoids. There were no polyps, masses, AVMs or colitis noted. The pathology revealed unremarkable ileal and cecal mucosa.  ?  Active Problems  Abdominal pain, diffuse (789.00) (R10.84)  Abdominal pain, epigastric (789.06) (R10.13)  Angiodysplasia of colon (569.84) (K55.20)  Chronic superficial gastritis without bleeding (535.10) (K29.30)  Diarrhea, unspecified type (787.91) (R19.7)  Diverticulosis of colon (562.10) (K57.30)  Dyspepsia (536.8) (R10.13)  Functional dyspepsia (536.8) (K30)  GERD without esophagitis (530.81) (K21.9)  Hiatal hernia (553.3) (K44.9)  High risk for colon cancer (V49.89) (Z91.89)  History of rectal bleeding (V12.79) (Z87.19)  Internal hemorrhoids (455.0) (K64.8)  Intestinal metaplasia of gastric mucosa (537.89) (K31.A0)  Iron deficiency (280.9) (E61.1)  Preop testing (V72.84) (Z01.818)  Preoperative examination (V72.84) (Z01.818)  Rectal pain (569.42) (K62.89)           ?  Past Medical History  History of high cholesterol (V12.29) (Z86.39)  History of hypertension (V12.59) (Z86.79)  History of thyroid disorder (V12.29) (Z86.39)           ?  Surgical History  History of  section  History of Thyroidectomy           ?  Social History  Does not use illicit drugs (V49.89) (Z78.9)  Former smoker (V15.82) (Z87.891)  No alcohol use  No illicit drug use  None           ?  Current Meds  Anusol-HC 25 MG Rectal Suppository; INSERT 1 SUPPOSITORY RECTALLY AT  BEDTIME AS NEEDED  Cholestyramine 4 GM Oral Packet; MIX THE CONTENTS OF 1 POWDER PACKET WITH 2  TO 6 OZ OF NONCARBONATED BEVERAGE AND DRINK 3 TIMES DAILY  clonazePAM 0.5 MG Oral Tablet  Crestor TABS  Famotidine 40 MG Oral Tablet; TAKE 1 TABLET BY MOUTH TWICE A DAY  Famotidine 40 MG Oral Tablet; TAKE 1 TABLET BY MOUTH TWICE DAILY  Famotidine 40 MG Oral Tablet; Take 1 tablet twice daily  Hydrocortisone (Perianal) 2.5 % External Cream; INSERT 1 APPLICATORFUL RECTALLY  AT BEDTIME AS NEEDED  levoFLOXacin 750 MG Oral Tablet  Liothyronine Sodium 5 MCG Oral Tablet  Metoprolol Tartrate 25 MG Oral Tablet  Pantoprazole Sodium 40 MG Oral Tablet Delayed Release; TAKE 1 TABLET BY MOUTH  EVERY DAY  Pantoprazole Sodium 40 MG Oral Tablet Delayed Release; TAKE 1 TABLET BY MOUTH  EVERY DAY  Pantoprazole Sodium 40 MG Oral Tablet Delayed Release; TAKE 1 TABLET DAILY  Pantoprazole Sodium 40 MG Oral Tablet Delayed Release; TAKE 1 TABLET DAILY  PEG 3350-KCl-Na Bicarb-NaCl 420 GM Oral Solution Reconstituted; TAKE AS  DIRECTED  PEG-3350/Electrolytes 236 GM Oral Solution Reconstituted; MIX AS DIRECTED AND  DRINK OVER 4 HOURS, START AT 4PM  Prolia 60 MG/ML Subcutaneous Solution Prefilled Syringe  Simethicone 180 MG Oral Capsule; TAKE 1 CAPSULE 4 TIMES DAILY AS NEEDED  Telmisartan 40 MG Oral Tablet           Allergies  No Known Drug Allergies           ?  Vitals  Vital Signs  ?  Recorded: [id number] 09:11AM  Systolic  147, LLE, Sitting  Diastolic  79, LLE, Sitting  Height  5 ft 4 in  Weight  131 lb  BMI Calculated  22.49 kg/m2  BSA Calculated  1.63 m2  Temperature  97.2 F, Temporal  Heart Rate  86  O2 Saturation  98 %, Room Air  FiO2 Flow Rate  0 L/min, Room Air           ?  Assessment  GERD without esophagitis (530.81) (K21.9)  Hiatal hernia (553.3) (K44.9)  Intestinal metaplasia of gastric mucosa (537.89) (K31.A0)  Dyspepsia (536.8) (R10.13)  High risk for colon cancer (V49.89) (Z91.89)    Dyspepsia: The patient complains of dyspeptic symptoms. The patient was advised to abide by an anti-gas (low FOD-MAP) diet. The patient was given a pamphlet for anti-gas (low FOD-MAP). The patient and I reviewed the anti-gas (low FOD-MAP) diet at length. The patient is to start on a trial of Simethicone one tablet 4 times a day p.r.n. abdominal pain and gas.  GERD: The patient was advised to avoid late-night meals and dietary indiscretions. The patient was advised to avoid fried and fatty foods. The patient was advised to abide by an anti-GERD diet. The patient was given a pamphlet for anti-GERD. The patient and I reviewed the anti-GERD diet at length. I recommend a trial of Pantoprazole 40 mg once a day x 3 months for the symptoms.  Diverticulosis: I recommend a high-fiber diet and avoid seeds. The patient is to consider a trial of Metamucil once a day for fiber supplementation. The patient is to also consider a trial of a probiotic such as Align once a day. The patient and I discussed the potential risk of diverticulitis and diverticular bleeding secondary to diverticular disease. The patient is aware of the importance of follow-up if these complications arise.  Internal Hemorrhoids: The patient is to consider starting a trial of Anusol H. C. suppositories one per rectum nightly and Anusol HC2.5% cream apply to affected area twice a day p.r.n. hemorrhoidal bleeding or pain. I also recommend a trial of Calmoseptine cream apply to affected area twice a day for hemorrhoidal discomfort. I recommend Tucks pads for the hemorrhoids. I recommend Sitz baths twice a day for the hemorrhoids. I recommend avoid wearing tight underwear and use boxers. I recommend avoid touching the perineal area. y. The patient agreed to followup.  Hiatal Hernia: The patient was advised to avoid late-night meals and dietary indiscretions. The patient was advised to avoid fried and fatty foods. The patient was advised to abide by an anti-GERD diet. The patient was given a pamphlet for anti-GERD. The patient and I reviewed the anti-GERD diet at length. I recommend a trial of Famotidine 40 mg twice a day for 3 months for the symptoms.  Gastritis: The patient has a history of gastritis. The patient is to avoid nonsteroidal anti-inflammatory drugs and aspirin. I recommend a trial of Famotidine 40 mg twice a day for 3 months for the symptoms.  Intestinal Metaplasia of the Stomach: The patient was found to have intestinal metaplasia of the stomach on recent upper endoscopy. The findings of intestinal metaplasia of the stomach have an increased risk of gastric cancer. Recent biopsies did not reveal dysplasia. I recommend a repeat upper endoscopy with mapping in 1 year to reassess for intestinal metaplasia and dysplasia unless symptomatic. The patient is aware of the increased risk of intestinal metaplasia and progression to stomach cancer. The patient agrees to follow-up.  Family History of GI Malignancy: The patient admits to family history of GI problems. The patient's sister had a history of a GYN tumor involving colon.  Upper Endoscopy: I recommend an upper endoscopy to assess for peptic ulcer disease versus esophagitis. The patient was told of the risks and benefits of the procedure. The patient was told of the risks of perforation, emergency surgery, bleeding, infections and missed lesions. The patient is told not to drive, drink alcohol, use recreational drugs, exercise, or work the day of the procedure. The patient was told of the need for an escort to accompany the patient home after the procedure. The patient is aware that the procedure may be cancelled if they fail to follow the directions. The patient agreed and will schedule for the procedure. The patient can take the antihypertensive medication with a sip of water one hour prior to the procedure. The patient is to be n.p.o. after midnight. The patient is to return for the procedure.  Follow-up: The patient is to follow-up in the office in 4 weeks to reassess the symptoms. The patient was told to call the office if any further problems.            ?  Plan  Abdominal pain, diffuse, Dyspepsia  Renew: Simethicone 180 MG Oral Capsule; TAKE 1 CAPSULE 4 TIMES DAILY AS  NEEDED  Abdominal pain, diffuse, Functional dyspepsia, GERD without esophagitis  Renew: Pantoprazole Sodium 40 MG Oral Tablet Delayed Release; TAKE 1 TABLET BY  MOUTH EVERY DAY  GERD without esophagitis  Upper GI Endoscopy; Status:Active; Requested for:[id number];           ?.

## 2024-11-14 NOTE — ASU PATIENT PROFILE, ADULT - NSICDXPASTMEDICALHX_GEN_ALL_CORE_FT
Bedside report to Susie BARBOSA.   
Pt discharged to home. Discharge paperwork provided. Education provided by ERP. Reinforced discharge instructions.  Pt was given follow up instructions and prescriptions.  Pt verbalized understanding of all instructions for discharge.   Patient went out of the ER ambulatory with steady gait., alert and oriented x 4, with all belongings.     Family to drive pt home. Consent for opiate prescription signed.   
PAST MEDICAL HISTORY:  Breast cancer LEFT    Hyperlipidemia     Hypertension

## 2024-11-14 NOTE — ASU DISCHARGE PLAN (ADULT/PEDIATRIC) - NS MD DC FALL RISK RISK
For information on Fall & Injury Prevention, visit: [email or web address] OR  [email or web address] OR  https://www.cdc.gov/steadi/patient.html

## 2024-11-18 DIAGNOSIS — R11.0 NAUSEA: ICD-10-CM

## 2024-11-18 LAB — SURGICAL PATHOLOGY STUDY: SIGNIFICANT CHANGE UP

## 2024-11-18 RX ORDER — ONDANSETRON 4 MG/1
4 TABLET, ORALLY DISINTEGRATING ORAL TWICE DAILY
Qty: 60 | Refills: 3 | Status: ACTIVE | COMMUNITY
Start: 2024-11-18 | End: 1900-01-01

## 2024-12-02 ENCOUNTER — NON-APPOINTMENT (OUTPATIENT)
Age: 74
End: 2024-12-02

## 2024-12-02 DIAGNOSIS — K59.00 CONSTIPATION, UNSPECIFIED: ICD-10-CM

## 2024-12-02 RX ORDER — POLYETHYLENE GLYCOL 3350 17 G/17G
17 POWDER, FOR SOLUTION ORAL DAILY
Qty: 510 | Refills: 3 | Status: ACTIVE | COMMUNITY
Start: 2024-12-02 | End: 1900-01-01

## 2024-12-04 ENCOUNTER — RX RENEWAL (OUTPATIENT)
Age: 74
End: 2024-12-04

## 2025-02-05 ENCOUNTER — APPOINTMENT (OUTPATIENT)
Dept: GASTROENTEROLOGY | Facility: CLINIC | Age: 75
End: 2025-02-05
Payer: MEDICARE

## 2025-02-05 VITALS
OXYGEN SATURATION: 100 % | SYSTOLIC BLOOD PRESSURE: 142 MMHG | TEMPERATURE: 97.5 F | BODY MASS INDEX: 20.83 KG/M2 | HEIGHT: 64 IN | WEIGHT: 122 LBS | DIASTOLIC BLOOD PRESSURE: 79 MMHG | HEART RATE: 92 BPM

## 2025-02-05 DIAGNOSIS — K64.8 OTHER HEMORRHOIDS: ICD-10-CM

## 2025-02-05 DIAGNOSIS — K44.9 DIAPHRAGMATIC HERNIA W/OUT OBSTRUCTION OR GANGRENE: ICD-10-CM

## 2025-02-05 DIAGNOSIS — K59.00 CONSTIPATION, UNSPECIFIED: ICD-10-CM

## 2025-02-05 DIAGNOSIS — K21.9 GASTRO-ESOPHAGEAL REFLUX DISEASE W/OUT ESOPHAGITIS: ICD-10-CM

## 2025-02-05 DIAGNOSIS — K29.30 CHRONIC SUPERFICIAL GASTRITIS W/OUT BLEEDING: ICD-10-CM

## 2025-02-05 DIAGNOSIS — K31.A0 GASTRIC INTESTINAL METAPLASIA, UNSPECIFIED: ICD-10-CM

## 2025-02-05 DIAGNOSIS — R10.13 EPIGASTRIC PAIN: ICD-10-CM

## 2025-02-05 DIAGNOSIS — L29.0 PRURITUS ANI: ICD-10-CM

## 2025-02-05 PROCEDURE — 99214 OFFICE O/P EST MOD 30 MIN: CPT

## 2025-05-05 ENCOUNTER — RX RENEWAL (OUTPATIENT)
Age: 75
End: 2025-05-05

## 2025-07-12 ENCOUNTER — RX RENEWAL (OUTPATIENT)
Age: 75
End: 2025-07-12

## 2025-08-19 ENCOUNTER — APPOINTMENT (OUTPATIENT)
Dept: GASTROENTEROLOGY | Facility: CLINIC | Age: 75
End: 2025-08-19

## 2025-08-20 ENCOUNTER — NON-APPOINTMENT (OUTPATIENT)
Age: 75
End: 2025-08-20

## 2025-08-22 ENCOUNTER — APPOINTMENT (OUTPATIENT)
Dept: GASTROENTEROLOGY | Facility: CLINIC | Age: 75
End: 2025-08-22

## 2025-08-22 VITALS
TEMPERATURE: 97.7 F | OXYGEN SATURATION: 97 % | DIASTOLIC BLOOD PRESSURE: 78 MMHG | HEART RATE: 72 BPM | WEIGHT: 132 LBS | HEIGHT: 64 IN | SYSTOLIC BLOOD PRESSURE: 138 MMHG | BODY MASS INDEX: 22.53 KG/M2

## (undated) DEVICE — CLAMP BX HOT RAD JAW 3

## (undated) DEVICE — TUBING CANNULA SALTER LABS NASAL ADULT 7FT

## (undated) DEVICE — SOLIDIFIER ISOLYZER 2000CC

## (undated) DEVICE — KIT ENDO CONSUMABLES REPROCESSING IF1000 8 USE

## (undated) DEVICE — SNARE LOOP POLY DISP 30MM LOOP

## (undated) DEVICE — SYR LUER LOK 50CC

## (undated) DEVICE — TUBING MEDI-VAC W MAXIGRIP CONNECTORS 1/4"X6'

## (undated) DEVICE — DRSG GAUZE 4X4"

## (undated) DEVICE — FORMALIN CONTAINER MED

## (undated) DEVICE — SOLIDIFIER CANN EXPRESS 3K

## (undated) DEVICE — ADAPTER ENDO CHNL SINGLE USE

## (undated) DEVICE — MASK LRG MED AND HIGH O2 CONC M TO M 10FT

## (undated) DEVICE — NDL INJ SCLERO INTERJECT 23G

## (undated) DEVICE — FORCEP RADIAL JAW 4 W NDL 2.2MM 2.8MM 240CM ORANGE DISP

## (undated) DEVICE — KIT ENDO PROCEDURE CUST W/VLV

## (undated) DEVICE — LUBE JELLY FOILPACK 36GM STERILE

## (undated) DEVICE — FORCEP BIOPSY 2.5MM DISP

## (undated) DEVICE — VALVE ENDO SURESEAL II 0-5FR

## (undated) DEVICE — RETRIEVER ROTH NET PLATINUM-UNIVERSAL

## (undated) DEVICE — TUBING IV SET GRAVITY 3Y 100" MACRO

## (undated) DEVICE — SENSOR O2 FINGER ADULT 24/CA

## (undated) DEVICE — KIT ENDO PROCEDURE CUST 10/BX

## (undated) DEVICE — VALVE ENDOSCOPE DEFENDO SINGLE USE

## (undated) DEVICE — SOL INJ NS 0.9% 500ML 1-PORT

## (undated) DEVICE — CATH ELCTR GLIDE PRB 7FR

## (undated) DEVICE — VENODYNE/SCD SLEEVE CALF MEDIUM

## (undated) DEVICE — BITE BLOCK ADULT 20 X 27MM (GREEN)

## (undated) DEVICE — FORMALIN CUPS 10% BUFFERED